# Patient Record
Sex: MALE | Race: OTHER | NOT HISPANIC OR LATINO
[De-identification: names, ages, dates, MRNs, and addresses within clinical notes are randomized per-mention and may not be internally consistent; named-entity substitution may affect disease eponyms.]

---

## 2017-03-27 PROBLEM — Z00.00 ENCOUNTER FOR PREVENTIVE HEALTH EXAMINATION: Status: ACTIVE | Noted: 2017-03-27

## 2022-04-13 ENCOUNTER — TRANSCRIPTION ENCOUNTER (OUTPATIENT)
Age: 88
End: 2022-04-13

## 2022-07-19 ENCOUNTER — INPATIENT (INPATIENT)
Facility: HOSPITAL | Age: 88
LOS: 1 days | Discharge: HOME | End: 2022-07-21
Attending: INTERNAL MEDICINE | Admitting: INTERNAL MEDICINE

## 2022-07-19 VITALS
RESPIRATION RATE: 18 BRPM | OXYGEN SATURATION: 100 % | SYSTOLIC BLOOD PRESSURE: 182 MMHG | TEMPERATURE: 98 F | DIASTOLIC BLOOD PRESSURE: 79 MMHG | HEART RATE: 65 BPM

## 2022-07-19 DIAGNOSIS — R56.9 UNSPECIFIED CONVULSIONS: ICD-10-CM

## 2022-07-19 LAB
ALBUMIN SERPL ELPH-MCNC: 4.2 G/DL — SIGNIFICANT CHANGE UP (ref 3.3–5)
ALP SERPL-CCNC: 67 U/L — SIGNIFICANT CHANGE UP (ref 40–120)
ALT FLD-CCNC: 14 U/L — SIGNIFICANT CHANGE UP (ref 4–41)
ANION GAP SERPL CALC-SCNC: 12 MMOL/L — SIGNIFICANT CHANGE UP (ref 7–14)
AST SERPL-CCNC: 17 U/L — SIGNIFICANT CHANGE UP (ref 4–40)
BASOPHILS # BLD AUTO: 0.05 K/UL — SIGNIFICANT CHANGE UP (ref 0–0.2)
BASOPHILS NFR BLD AUTO: 0.8 % — SIGNIFICANT CHANGE UP (ref 0–2)
BILIRUB SERPL-MCNC: 0.4 MG/DL — SIGNIFICANT CHANGE UP (ref 0.2–1.2)
BUN SERPL-MCNC: 19 MG/DL — SIGNIFICANT CHANGE UP (ref 7–23)
CALCIUM SERPL-MCNC: 9.6 MG/DL — SIGNIFICANT CHANGE UP (ref 8.4–10.5)
CHLORIDE SERPL-SCNC: 105 MMOL/L — SIGNIFICANT CHANGE UP (ref 98–107)
CO2 SERPL-SCNC: 24 MMOL/L — SIGNIFICANT CHANGE UP (ref 22–31)
CREAT SERPL-MCNC: 1.1 MG/DL — SIGNIFICANT CHANGE UP (ref 0.5–1.3)
EGFR: 63 ML/MIN/1.73M2 — SIGNIFICANT CHANGE UP
EOSINOPHIL # BLD AUTO: 0.21 K/UL — SIGNIFICANT CHANGE UP (ref 0–0.5)
EOSINOPHIL NFR BLD AUTO: 3.2 % — SIGNIFICANT CHANGE UP (ref 0–6)
GLUCOSE SERPL-MCNC: 107 MG/DL — HIGH (ref 70–99)
HCT VFR BLD CALC: 41.1 % — SIGNIFICANT CHANGE UP (ref 39–50)
HGB BLD-MCNC: 13.2 G/DL — SIGNIFICANT CHANGE UP (ref 13–17)
IANC: 4.44 K/UL — SIGNIFICANT CHANGE UP (ref 1.8–7.4)
IMM GRANULOCYTES NFR BLD AUTO: 0.5 % — SIGNIFICANT CHANGE UP (ref 0–1.5)
LYMPHOCYTES # BLD AUTO: 0.98 K/UL — LOW (ref 1–3.3)
LYMPHOCYTES # BLD AUTO: 15 % — SIGNIFICANT CHANGE UP (ref 13–44)
MCHC RBC-ENTMCNC: 29.5 PG — SIGNIFICANT CHANGE UP (ref 27–34)
MCHC RBC-ENTMCNC: 32.1 GM/DL — SIGNIFICANT CHANGE UP (ref 32–36)
MCV RBC AUTO: 91.7 FL — SIGNIFICANT CHANGE UP (ref 80–100)
MONOCYTES # BLD AUTO: 0.84 K/UL — SIGNIFICANT CHANGE UP (ref 0–0.9)
MONOCYTES NFR BLD AUTO: 12.8 % — SIGNIFICANT CHANGE UP (ref 2–14)
NEUTROPHILS # BLD AUTO: 4.44 K/UL — SIGNIFICANT CHANGE UP (ref 1.8–7.4)
NEUTROPHILS NFR BLD AUTO: 67.7 % — SIGNIFICANT CHANGE UP (ref 43–77)
NRBC # BLD: 0 /100 WBCS — SIGNIFICANT CHANGE UP
NRBC # FLD: 0 K/UL — SIGNIFICANT CHANGE UP
PLATELET # BLD AUTO: 222 K/UL — SIGNIFICANT CHANGE UP (ref 150–400)
POTASSIUM SERPL-MCNC: 5 MMOL/L — SIGNIFICANT CHANGE UP (ref 3.5–5.3)
POTASSIUM SERPL-SCNC: 5 MMOL/L — SIGNIFICANT CHANGE UP (ref 3.5–5.3)
PROT SERPL-MCNC: 6.9 G/DL — SIGNIFICANT CHANGE UP (ref 6–8.3)
RBC # BLD: 4.48 M/UL — SIGNIFICANT CHANGE UP (ref 4.2–5.8)
RBC # FLD: 13.6 % — SIGNIFICANT CHANGE UP (ref 10.3–14.5)
SODIUM SERPL-SCNC: 141 MMOL/L — SIGNIFICANT CHANGE UP (ref 135–145)
TROPONIN T, HIGH SENSITIVITY RESULT: 12 NG/L — SIGNIFICANT CHANGE UP
TROPONIN T, HIGH SENSITIVITY RESULT: 40 NG/L — SIGNIFICANT CHANGE UP
WBC # BLD: 6.55 K/UL — SIGNIFICANT CHANGE UP (ref 3.8–10.5)
WBC # FLD AUTO: 6.55 K/UL — SIGNIFICANT CHANGE UP (ref 3.8–10.5)

## 2022-07-19 PROCEDURE — 70498 CT ANGIOGRAPHY NECK: CPT | Mod: 26,MA

## 2022-07-19 PROCEDURE — 99284 EMERGENCY DEPT VISIT MOD MDM: CPT | Mod: FS

## 2022-07-19 PROCEDURE — 99223 1ST HOSP IP/OBS HIGH 75: CPT

## 2022-07-19 PROCEDURE — 70496 CT ANGIOGRAPHY HEAD: CPT | Mod: 26,MA

## 2022-07-19 PROCEDURE — 70450 CT HEAD/BRAIN W/O DYE: CPT | Mod: 26,MA

## 2022-07-19 RX ORDER — DEXAMETHASONE 0.5 MG/5ML
4 ELIXIR ORAL ONCE
Refills: 0 | Status: COMPLETED | OUTPATIENT
Start: 2022-07-19 | End: 2022-07-19

## 2022-07-19 RX ADMIN — Medication 4 MILLIGRAM(S): at 21:59

## 2022-07-19 RX ADMIN — Medication 2 MILLIGRAM(S): at 20:00

## 2022-07-19 NOTE — CONSULT NOTE ADULT - SUBJECTIVE AND OBJECTIVE BOX
NEUROSURGERY CONSULT 89M pmhx htn, hld, cad stents on xarelto, bypass, PPM, known, brain mass meningioma. all previous care at [facility] Neurologist is Dr. Hawthorne. He presented today from intermediate after episode of syncope. While in ED at [facility] had a grand mal seizure, ativan given and CTH done. CTH showing R. posterior frontal mass with vasogenic edema.     HPI: 89y Male    RADIOLOGY: IMPRESSION:    HEAD CT: Mild volume loss, microvascular disease, no acute hemorrhage or   midline shift.  Right posterior frontal mass 2.8 x 2.9 x 2.5 cm near the falx suggesting   meningioma, however there is extensive adjacent vasogenic edema in the   right frontal lobe. History of "benign brain tumor."      MEDS:  dexAMETHasone  Injectable 4 milliGRAM(s) IV Push Once  LORazepam   Injectable 2 milliGRAM(s) IV Push once      PHYSICAL EXAM: post ictal, ativan given, awake, OE, FC  A&Ox1  R. pupil surgical, L. pupil reactive  [place] antigravity    Vital Signs Last 24 Hrs  T(C): 36.7 (19 Jul 2022 16:13), Max: 36.7 (19 Jul 2022 16:13)  T(F): 98 (19 Jul 2022 16:13), Max: 98 (19 Jul 2022 16:13)  HR: 65 (19 Jul 2022 16:13) (65 - 65)  BP: 182/79 (19 Jul 2022 16:13) (182/79 - 182/79)  BP(mean): --  RR: 18 (19 Jul 2022 16:13) (18 - 18)  SpO2: 100% (19 Jul 2022 16:13) (100% - 100%)        LABS:                        13.2   6.55  )-----------( 222      ( 19 Jul 2022 19:45 )             41.1     07-19    141  |  105  |  19  ----------------------------<  107<H>  5.0   |  24  |  1.10    Ca    9.6      19 Jul 2022 19:45    TPro  6.9  /  Alb  4.2  /  TBili  0.4  /  DBili  x   /  AST  17  /  ALT  14  /  AlkPhos  67  07-19

## 2022-07-19 NOTE — CONSULT NOTE ADULT - ASSESSMENT
89M with multiple comorbidities s/p syncope, seizure with known R. sided brain mass likely meningioma with edema, no midline shift.     Recc:  - medicine admit for syncope w/u  - neurology f/u for seizures ( veeg)  - c/w AED  - decadron 4q6  - mri brain w/wo if PPM compatible  - Obtain prior imaging from OSH for comparison  - d/w family C Alert and oriented to person, place, time/situation. normal mood and affect. no apparent risk to self or others.

## 2022-07-19 NOTE — ED PROVIDER NOTE - ATTENDING APP SHARED VISIT CONTRIBUTION OF CARE
89e yo m past medical history htn, hld, cad s/p stents, cabg, PPM, benign brain mass (?meningioma per daughter), seizure disorder presents after loc, ?fainting during a lecture at his assisted living. no known head trauma, did report left knee and hip pain. At time of my eval patient experience generalized seizure. given 2mg ativan iv with session of seizure.   GEN: no acute respiratory distress. post-ictal  HEENT: NCAT. face symmetrical. chronic right irregular pupil-post sx. left pupil 3mm reactive. MMM, abrasion to left medial tongue.   Neck: no JVD, trachea midline, no LAD  CV: RRR. +S1S2, no murmur. 2+ pulses in 4 extremities, cap refill <2 sec  Chest: CTA B/l. no wheezing, rales, rhonchi. no retractions. good air movement.   ABD: +BS, soft, non distended, non tender.   : no cva or suprapubic tenderness  MSK: No clubbing, cyanosis, edema. passive FROM of all extremities. no gross deformity. no tenderness to palpation. No midline or paraspinal tenderness. no spinal step-offs.  Neuro: post-ictal, responding to noxious stimulus, name  SKIN: No erythema, lesions or rash, lacerations.   ct brain expedited no acute bleeding, likely meningioma with vasogenic edema. NSX and Neurology consulted. daughter at bedside and frequently updated.

## 2022-07-19 NOTE — ED ADULT NURSE NOTE - CHIEF COMPLAINT QUOTE
Pt brought in by EMS from assisted living after a syncope episode while sitting in a chair. Pt states he slid out of a chair. Pt has a hx of seizures and medication recently changed. Pt takes Xarelto. Pt denies chest pain, sob, n/v/d, fever or chills. As per Daughter pt has a hx of a benign brain tumor.

## 2022-07-19 NOTE — ED PROVIDER NOTE - PROGRESS NOTE DETAILS
cara manuel: pt had witnissed grand mal seizure few min ago, given ativan, placed on NRB, monitor, will expedite CT cara manuel: pt had witnissed grand mal seizure few min ago, given ativan, placed on NRB, monitor, will expedite CT  Octavio Denise DO: agree with above. on lamotrigine 100mg qd increased to this dose 2 weeks ago. per daughter is med adherent cara manuel: pt seen by neurology, neurosurgery, pt will be admitted for mri and further workup to medicine

## 2022-07-19 NOTE — ED PROVIDER NOTE - CLINICAL SUMMARY MEDICAL DECISION MAKING FREE TEXT BOX
89 y/o male pmh htn, hld CAD, stents, bypass, pacemaker, benign brain tumor, seizure d/o last one 3  months ago, resides in assisted living, was at a lecture few hours ago, and "fainted", as per the patient he fell asleep, others say he fainted, fell onto his left side, denies hitting his head, any HA, neck pain, visual disturbances, denies any aura or preceding symptoms, states that he did 1 hr of physical therapy this mornig and wa stired from it, denies any chest pain, sob, abdominal pain, urinary symptoms, numbness/weakness/tingling  - exam w nl,   ekg shows 89 y/o male pmh htn, hld CAD, stents, bypass, pacemaker, benign brain tumor, seizure d/o last one 3  months ago, resides in assisted living, was at a lecture few hours ago, and "fainted", as per the patient he fell asleep, others say he fainted, fell onto his left side, denies hitting his head, any HA, neck pain, visual disturbances, denies any aura or preceding symptoms, states that he did 1 hr of physical therapy this mornig and wa stired from it, denies any chest pain, sob, abdominal pain, urinary symptoms, numbness/weakness/tingling   ekg shows- ventricula paced  labs, head ct,  hip/femur/knee xr, reasses

## 2022-07-19 NOTE — ED ADULT NURSE NOTE - OBJECTIVE STATEMENT
Pt arriving to room 29 A&XO4 ambulatory s/p fall today. As per Pt family, Pt was asleep in a chair and fell on the floor. Pt does not remember falling, unsure if he hit his head. Pt on AC for "heart problems." Denies HA. No obvious deformity noted. No facial droop/slurred speech. Daughter at bedside. MD valencia pending.

## 2022-07-19 NOTE — ED ADULT NURSE REASSESSMENT NOTE - NS ED NURSE REASSESS COMMENT FT1
at approx 2015pm today, pt began having seizure witnessed by daughter. ED MD made aware immediately. seizue lasted approx 2min, pt bit tongue and pt suctioned. 2mg ativan given. pt placed on non rebreather and sent to ct scan. pt now lethargic but can be aroused.

## 2022-07-19 NOTE — ED PROVIDER NOTE - OBJECTIVE STATEMENT
89 y/o male pmh htn, hld CAD, stents, bypass, pacemaker, benign brain tumor, seizure d/o last one 3  months ago, resides in assisted living, was at a lecture few hours ago, and "fainted", as per the patient he fell asleep, others say he fainted, fell onto his left side, denies hitting his head, any HA, neck pain, visual disturbances, denies any aura or preceding symptoms, states that he did 1 hr of physical therapy this mornig and wa stired from it, denies any chest pain, sob, abdominal pain, urinary symptoms, numbness/weakness/tingling 89 y/o male pmh htn, hld CAD, stents, bypass, pacemaker, benign brain tumor, seizure d/o last one 3  months ago, resides in assisted living, was at a lecture few hours ago, and "fainted", as per the patient he fell asleep, others say he fainted, fell onto his left side, denies hitting his head, any HA, neck pain, visual disturbances, denies any aura or preceding symptoms, states that he did 1 hr of physical therapy this mornig and wa stired from it, denies any chest pain, sob, abdominal pain, urinary symptoms, numbness/weakness/tingling. pt is on xarelto, denies hitting his head 89 y/o male pmh htn, hld CAD, stents, bypass, pacemaker, benign brain tumor, seizure d/o last one 3  months ago, resides in assisted living, was at a lecture few hours ago, and "fainted", as per the patient he fell asleep, others say he fainted, fell onto his left side, denies hitting his head, any HA, neck pain, visual disturbances, denies any aura or preceding symptoms, states that he did 1 hr of physical therapy this mornig and was tired from it, denies any chest pain, sob, abdominal pain, urinary symptoms, numbness/weakness/tingling. pt is on xarelto, denies hitting his head. + c/o left  hip/knee pain

## 2022-07-19 NOTE — CONSULT NOTE ADULT - ASSESSMENT
89y (18-Dec-1928) man with a PMHx significant for HTN, HLD, CAD s/p stents, pacemaker, h/o benign brain tumor, seizures (last reported seizure 3 months ago), who presented to the ED for syncope. Neurology was consulted for seizure management. PT was at a lecture a few hours ago and fainted vs was sleeping vs seizure and fell onto his left side. While in the ED pt had a witnessed generalized tonic clonic seizure lasting two min and broke with 2mg ativan. Pt seen by Neurology and was confused, likely post ictal. otherwise denied any pain or distress. further history limited due to mental status change. Per ED, pt on lamotrigine 100mg qd increased to this dose 2 weeks ago. per daughter is med adherent.    Impression:    Generalized tonic clonic seizure likely 2/2 right posterior frontal mass near the falx with extensive adjacent vasogenic edema in the right frontal lobe    Recommendations     [] Hold PTA lemictal   [] Give 20 mg/kg of Keppra stat. Followed by 1 gram BID starting tomorrow AM.   [] Obtain baseline EKG to assess NM elongation   [] Obtain VEEG   [] follow up with Neurosurgery, recs appreciated   [] MRI brain with and without contrast   [] Start steroids as per Neurosurgery recs     Rest of work up per primary team     Discuss case with Epilepsy fellow Dr. Long.   Case to be seen in AM by Rebel   89y (18-Dec-1928) man with a PMHx significant for HTN, HLD, CAD s/p stents, pacemaker, h/o benign brain tumor, seizures (last reported seizure 3 months ago), who presented to the ED for syncope. Neurology was consulted for seizure management. PT was at a lecture a few hours ago and fainted vs was sleeping vs seizure and fell onto his left side. While in the ED pt had a witnessed generalized tonic clonic seizure lasting two min and broke with 2mg ativan. Pt seen by Neurology and was confused, likely post ictal. otherwise denied any pain or distress. further history limited due to mental status change. Per ED, pt on lamotrigine 100mg qd increased to this dose 2 weeks ago. per daughter is med adherent.    Impression:    Generalized tonic clonic seizure likely 2/2 right posterior frontal mass near the falx with extensive adjacent vasogenic edema in the right frontal lobe    Recommendations     [] Hold PTA lemictal   [] Give 20 mg/kg of Keppra stat. Followed by 1 gram BID starting tomorrow AM.   [] Obtain baseline EKG to assess CO elongation   [] Obtain VEEG   [] follow up with Neurosurgery, recs appreciated   [] MRI brain with and without contrast   [] Start steroids as per Neurosurgery recs     Rest of work up per primary team     Discuss case with Epilepsy fellow Dr. Long.   Case to be seen in AM by Rebel     NEUROLOGY ATTENDING ADDENDUM    I personally interviewed, examined, and participated in the care of this patient, on rounds 7/20/22 with the neurology consult service team.  Reviewed findings and management plan with the team.  In addition to or instead of the Hx and findings and plan reported above, or in particular, I note as follows:    The lecture he attended was in his assisted living facility.      On examination now (~noon) awake, alert.  Normal comprehension, expression, prosody, articulation.  Gives correct date.  Aware of current events (expounds on recent supreme court dec=ision, war in [place], . . . ).  Nems fingers correctly, quickly.  Serial 7s: cannnot get past 93.  On attempting to mimic examiner's posture of double crossover of the UEs he mirror images  rather than copying correctly; even when examiner shifts to stand beside him looking in the same direction he does not recognize the problem and does not correct.  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      No UE drift.   Kaleb of UE intact.  No UE drift.  Grossly normal symmetric limb motor strength on confrontation testing.  Gait and station not assesssed    Post-ictal confusion apparently has resolved.  I agree with the above assessment and recommendations.  In addition, he appears to have some subtle cognitive deficits (attention on seria 7s, spatial orientation).  I agree with the above recommendations.  In addition, if not already ordered, please request B12, folate, methylmalonic acid, homocysteine, TSH, syphilis erology.      The assay for B12 does not measure B12 level directly.  False normal and false elevations (to or above the normal range) occur with pernicious anemia due to intrinsic factor antibodies, which may or may not be detected by antibody tests.  B12-like compounds of vegetable origin (a problem with vegans) without cobalamin activity also can lead to falsely normal or elevated determinations.  Methylmalonic acid (MMA) and homocysteine (Hcy) determinations are necessary to elucidate what is happening.    Irrespective of B12 and folate levels:       high Hcy w normal MMA implies folate deficiency;        high MMA and Hcy implies B12 deficiency +/- folate deficiency.                                                         IMPORTANT  -  PLEASE NOTE:                              I am a neurohospitalist. I do not see patients outside of the hospital.        Patients requiring neurological follow-up after discharge may contact one of the following offices.     [facility] [address] [place] [address] [phone]    [facility]  [address].  [place], [place]  [phone]      Amanda Luque M.D.   - Department of Neurology  [employer] at [facility]    89y (18-Dec-1928) man with a PMHx significant for HTN, HLD, CAD s/p stents, pacemaker, h/o benign brain tumor, seizures (last reported seizure 3 months ago), who presented to the ED for syncope. Neurology was consulted for seizure management. PT was at a lecture a few hours ago and fainted vs was sleeping vs seizure and fell onto his left side. While in the ED pt had a witnessed generalized tonic clonic seizure lasting two min and broke with 2mg ativan. Pt seen by Neurology and was confused, likely post ictal. otherwise denied any pain or distress. further history limited due to mental status change. Per ED, pt on lamotrigine 100mg qd increased to this dose 2 weeks ago. per daughter is med adherent.    Impression:    Generalized tonic clonic seizure likely 2/2 right posterior frontal mass near the falx with extensive adjacent vasogenic edema in the right frontal lobe    Recommendations     [] Hold PTA lemictal   [] Give 20 mg/kg of Keppra stat. Followed by 1 gram BID starting tomorrow AM.   [] Obtain baseline EKG to assess TN elongation   [] Obtain VEEG   [] follow up with Neurosurgery, recs appreciated   [] MRI brain with and without contrast   [] Start steroids as per Neurosurgery recs NEUROLOGY ATTENDING: we disagree; no indocation for steroid Rx at this time.  Would D/C.      Rest of work up per primary team     Discuss case with Epilepsy fellow Dr. Long.   Case to be seen in AM by Rebel     NEUROLOGY ATTENDING ADDENDUM    I personally interviewed, examined, and participated in the care of this patient, on rounds 7/20/22 with the neurology consult service team.  Reviewed findings and management plan with the team.  In addition to or instead of the Hx and findings and plan reported above, or in particular, I note as follows:    The lecture he attended was in his assisted living facility.      On examination now (~noon) awake, alert.  Normal comprehension, expression, prosody, articulation.  Gives correct date.  Aware of current events (expounds on recent supreme court dec=ision, war in [place], . . . ).  Nems fingers correctly, quickly.  Serial 7s: cannnot get past 93.  On attempting to mimic examiner's posture of double crossover of the UEs he mirror images  rather than copying correctly; even when examiner shifts to stand beside him looking in the same direction he does not recognize the problem and does not correct.  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      No UE drift.   Kaleb of UE intact.  No UE drift.  Grossly normal symmetric limb motor strength on confrontation testing.  Gait and station not assesssed    Post-ictal confusion apparently has resolved.  I agree with the above assessment and recommendations.  In addition, he appears to have some subtle cognitive deficits (attention on seria 7s, spatial orientation).  I agree with the above recommendations.  In addition, if not already ordered, please request B12, folate, methylmalonic acid, homocysteine, TSH, syphilis erology.      The assay for B12 does not measure B12 level directly.  False normal and false elevations (to or above the normal range) occur with pernicious anemia due to intrinsic factor antibodies, which may or may not be detected by antibody tests.  B12-like compounds of vegetable origin (a problem with vegans) without cobalamin activity also can lead to falsely normal or elevated determinations.  Methylmalonic acid (MMA) and homocysteine (Hcy) determinations are necessary to elucidate what is happening.    Irrespective of B12 and folate levels:       high Hcy w normal MMA implies folate deficiency;        high MMA and Hcy implies B12 deficiency +/- folate deficiency.                                                         IMPORTANT  -  PLEASE NOTE:                              I am a neurohospitalist. I do not see patients outside of the hospital.        Patients requiring neurological follow-up after discharge may contact one of the following offices.     [address] [id number] [phone]    [facility]  [address].  [place], [place]  [phone]      Amanda Luque M.D.   - Department of Neurology  [employer] at [facility]/[place]

## 2022-07-19 NOTE — CONSULT NOTE ADULT - SUBJECTIVE AND OBJECTIVE BOX
Neurology - Consult Note    -  Spectra: [phone] ([facility]), [phone] ([facility])  -    HPI: Patient TOBIN MALDONADO is a 89y (18-Dec-1928) man with a PMHx significant for HTN, HLD, CAD s/p stents, pacemaker, h/o benign brain tumor, seizures (last reported seizure 3 months ago), who presented to the ED for syncope. Neurology was consulted for seizure management. PT was at a lecture a few hours ago and fainted vs was sleeping vs seizure and fell onto his left side. While in the ED pt had a witnessed generalized tonic clonic seizure lasting two min and broke with 2mg ativan. Pt seen by Neurology and was confused, likely post ictal. otherwise denied any pain or distress. further history limited due to mental status change. Per ED, pt on lamotrigine 100mg qd increased to this dose 2 weeks ago. per daughter is med adherent.      Review of Systems:    unable to assess due to mental status change       PMHx/PSHx/Family Hx: As above, otherwise see below         Medications:  MEDICATIONS  (STANDING):  dexAMETHasone  Injectable 4 milliGRAM(s) IV Push Once  LORazepam   Injectable 2 milliGRAM(s) IV Push once    MEDICATIONS  (PRN):      Vitals:  T(C): 36.7 (07-19-22 @ 16:13), Max: 36.7 (07-19-22 @ 16:13)  HR: 65 (07-19-22 @ 16:13) (65 - 65)  BP: 182/79 (07-19-22 @ 16:13) (182/79 - 182/79)  RR: 18 (07-19-22 @ 16:13) (18 - 18)  SpO2: 100% (07-19-22 @ 16:13) (100% - 100%)    Physical Examination:   General - laying down with eyes closed. in no acute distress   Cardiovascular - Peripheral pulses palpable, no edema    Neurologic Exam:  Mental status - Awake, Alert, not oriented to person, place, or time. thought his age was 38 and could not provide the year.   Cranial nerves - Surgical pupil in R eye with no apparent APD. unable to assess further due to non-compliance and mental status changes. Face symmetrical.   Motor - Normal bulk and tone throughout. moves all extremities spontaneously.  Sensation - unable to assess due to mental status change   DTR's - Toes/plantar response withdrawals b/l   Coordination - unable to assess due to mental status change   Gait and station unable to assess due to mental status change     Labs:                        13.2   6.55  )-----------( 222      ( 19 Jul 2022 19:45 )             41.1     07-19    141  |  105  |  19  ----------------------------<  107<H>  5.0   |  24  |  1.10    Ca    9.6      19 Jul 2022 19:45    TPro  6.9  /  Alb  4.2  /  TBili  0.4  /  DBili  x   /  AST  17  /  ALT  14  /  AlkPhos  67  07-19    CAPILLARY BLOOD GLUCOSE        LIVER FUNCTIONS - ( 19 Jul 2022 19:45 )  Alb: 4.2 g/dL / Pro: 6.9 g/dL / ALK PHOS: 67 U/L / ALT: 14 U/L / AST: 17 U/L / GGT: x               Radiology:  CT Head No Cont:  (19 Jul 2022 20:27)  < from: CT Head No Cont (07.19.22 @ 20:27) >  IMPRESSION:    HEAD CT: Mild volume loss, microvascular disease, no acute hemorrhage or   midline shift.  Right posterior frontal mass 2.8 x 2.9 x 2.5 cm near the falx suggesting   meningioma, however there is extensive adjacent vasogenic edema in the   right frontal lobe. History of "benign brain tumor."    --- End of Report ---    < end of copied text >       Neurology - Consult Note    -  Spectra: [phone] ([facility]), [phone] ([facility])  -    HPI: Patient TOBIN MALDONADO is a 89y (18-Dec-1928) man with a PMHx significant for HTN, HLD, CAD s/p stents, pacemaker, h/o benign brain tumor, seizures (last reported seizure 3 months ago), who presented to the ED for syncope. Neurology was consulted for seizure management. PT was at a lecture a few hours ago and fainted vs was sleeping vs seizure and fell onto his left side. While in the ED pt had a witnessed generalized tonic clonic seizure lasting two min and broke with 2mg ativan. Pt seen by Neurology and was confused, likely post ictal. otherwise denied any pain or distress. further history limited due to mental status change. Per ED, pt on lamotrigine 100mg qd increased to this dose 2 weeks ago. per daughter is med adherent.      Review of Systems:    unable to assess due to mental status change       PMHx/PSHx/Family Hx: As above, otherwise see below         Medications:  MEDICATIONS  (STANDING):  dexAMETHasone  Injectable 4 milliGRAM(s) IV Push Once  LORazepam   Injectable 2 milliGRAM(s) IV Push once    MEDICATIONS  (PRN):      Vitals:  T(C): 36.7 (07-19-22 @ 16:13), Max: 36.7 (07-19-22 @ 16:13)  HR: 65 (07-19-22 @ 16:13) (65 - 65)  BP: 182/79 (07-19-22 @ 16:13) (182/79 - 182/79)  RR: 18 (07-19-22 @ 16:13) (18 - 18)  SpO2: 100% (07-19-22 @ 16:13) (100% - 100%)    Physical Examination:   General - laying down with eyes closed. in no acute distress   Cardiovascular - Peripheral pulses palpable, no edema    Neurologic Exam:  Mental status - Awake, Alert, not oriented to person, [Neurology Attending comment: there is NO SUCH THING as not "oriented" to self. This is a historically misconstrued concept perpetuated by longstanding bad habit.  Inability to answer the question "what is your name?" does not mean the person has lost personal identity!] place, or time. thought his age was 38 and could not provide the year.   Cranial nerves - Surgical pupil in R eye with no apparent APD. unable to assess further due to non-compliance and mental status changes. Face symmetrical.   Motor - Normal bulk and tone throughout. moves all extremities spontaneously.  Sensation - unable to assess due to mental status change   DTR's - Toes/plantar response withdrawals b/l   Coordination - unable to assess due to mental status change   Gait and station unable to assess due to mental status change     Labs:                        13.2   6.55  )-----------( 222      ( 19 Jul 2022 19:45 )             41.1     07-19    141  |  105  |  19  ----------------------------<  107<H>  5.0   |  24  |  1.10    Ca    9.6      19 Jul 2022 19:45    TPro  6.9  /  Alb  4.2  /  TBili  0.4  /  DBili  x   /  AST  17  /  ALT  14  /  AlkPhos  67  07-19    CAPILLARY BLOOD GLUCOSE        LIVER FUNCTIONS - ( 19 Jul 2022 19:45 )  Alb: 4.2 g/dL / Pro: 6.9 g/dL / ALK PHOS: 67 U/L / ALT: 14 U/L / AST: 17 U/L / GGT: x               Radiology:  CT Head No Cont:  (19 Jul 2022 20:27)  < from: CT Head No Cont (07.19.22 @ 20:27) >  IMPRESSION:    HEAD CT: Mild volume loss, microvascular disease, no acute hemorrhage or   midline shift.  Right posterior frontal mass 2.8 x 2.9 x 2.5 cm near the falx suggesting   meningioma, however there is extensive adjacent vasogenic edema in the   right frontal lobe. History of "benign brain tumor."    --- End of Report ---    < end of copied text >

## 2022-07-19 NOTE — ED PROVIDER NOTE - NS ED ATTENDING STATEMENT MOD
This was a shared visit with the FLAQUITO. I reviewed and verified the documentation and independently performed the documented:

## 2022-07-19 NOTE — ED ADULT TRIAGE NOTE - CHIEF COMPLAINT QUOTE
Pt brought in by EMS from assisted living after a syncope episode while sitting in a chair. Pt states he slid out of a chair. Pt states he has had multiple syncopal episodes over the past few weeks. Pt takes Xarelto. Pt denies chest pain, sob, n/v/d, fever or chills. Pt brought in by EMS from assisted living after a syncope episode while sitting in a chair. Pt states he slid out of a chair. Pt has a hx of seizures and medication recently changed. Pt takes Xarelto. Pt denies chest pain, sob, n/v/d, fever or chills. Pt brought in by EMS from assisted living after a syncope episode while sitting in a chair. Pt states he slid out of a chair. Pt has a hx of seizures and medication recently changed. Pt takes Xarelto. Pt denies chest pain, sob, n/v/d, fever or chills. As per Daughter pt has a hx of a benign brain tumor.

## 2022-07-20 DIAGNOSIS — R06.02 SHORTNESS OF BREATH: ICD-10-CM

## 2022-07-20 DIAGNOSIS — R55 SYNCOPE AND COLLAPSE: ICD-10-CM

## 2022-07-20 DIAGNOSIS — G93.89 OTHER SPECIFIED DISORDERS OF BRAIN: ICD-10-CM

## 2022-07-20 DIAGNOSIS — Z95.1 PRESENCE OF AORTOCORONARY BYPASS GRAFT: Chronic | ICD-10-CM

## 2022-07-20 DIAGNOSIS — Z29.9 ENCOUNTER FOR PROPHYLACTIC MEASURES, UNSPECIFIED: ICD-10-CM

## 2022-07-20 DIAGNOSIS — E78.5 HYPERLIPIDEMIA, UNSPECIFIED: ICD-10-CM

## 2022-07-20 DIAGNOSIS — R56.9 UNSPECIFIED CONVULSIONS: ICD-10-CM

## 2022-07-20 DIAGNOSIS — G40.409 OTHER GENERALIZED EPILEPSY AND EPILEPTIC SYNDROMES, NOT INTRACTABLE, WITHOUT STATUS EPILEPTICUS: ICD-10-CM
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
## 2022-07-20 DIAGNOSIS — R91.1 SOLITARY PULMONARY NODULE: ICD-10-CM

## 2022-07-20 DIAGNOSIS — I10 ESSENTIAL (PRIMARY) HYPERTENSION: ICD-10-CM

## 2022-07-20 DIAGNOSIS — I48.20 CHRONIC ATRIAL FIBRILLATION, UNSPECIFIED: ICD-10-CM

## 2022-07-20 DIAGNOSIS — I25.10 ATHEROSCLEROTIC HEART DISEASE OF NATIVE CORONARY ARTERY WITHOUT ANGINA PECTORIS: ICD-10-CM

## 2022-07-20 DIAGNOSIS — Z95.0 PRESENCE OF CARDIAC PACEMAKER: Chronic | ICD-10-CM

## 2022-07-20 DIAGNOSIS — Z96.641 PRESENCE OF RIGHT ARTIFICIAL HIP JOINT: Chronic | ICD-10-CM

## 2022-07-20 DIAGNOSIS — Z86.59 PERSONAL HISTORY OF OTHER MENTAL AND BEHAVIORAL DISORDERS: ICD-10-CM

## 2022-07-20 LAB
A1C WITH ESTIMATED AVERAGE GLUCOSE RESULT: 5.5 % — SIGNIFICANT CHANGE UP (ref 4–5.6)
ANION GAP SERPL CALC-SCNC: 16 MMOL/L — HIGH (ref 7–14)
BUN SERPL-MCNC: 17 MG/DL — SIGNIFICANT CHANGE UP (ref 7–23)
CALCIUM SERPL-MCNC: 9.2 MG/DL — SIGNIFICANT CHANGE UP (ref 8.4–10.5)
CHLORIDE SERPL-SCNC: 100 MMOL/L — SIGNIFICANT CHANGE UP (ref 98–107)
CHOLEST SERPL-MCNC: 187 MG/DL — SIGNIFICANT CHANGE UP
CK MB BLD-MCNC: 4.9 % — HIGH (ref 0–2.5)
CK MB CFR SERPL CALC: 3.5 NG/ML — SIGNIFICANT CHANGE UP
CK SERPL-CCNC: 72 U/L — SIGNIFICANT CHANGE UP (ref 30–200)
CO2 SERPL-SCNC: 20 MMOL/L — LOW (ref 22–31)
CREAT SERPL-MCNC: 0.88 MG/DL — SIGNIFICANT CHANGE UP (ref 0.5–1.3)
EGFR: 81 ML/MIN/1.73M2 — SIGNIFICANT CHANGE UP
ESTIMATED AVERAGE GLUCOSE: 111 — SIGNIFICANT CHANGE UP
FLUAV AG NPH QL: SIGNIFICANT CHANGE UP
FLUBV AG NPH QL: SIGNIFICANT CHANGE UP
GLUCOSE SERPL-MCNC: 161 MG/DL — HIGH (ref 70–99)
HCT VFR BLD CALC: 43.6 % — SIGNIFICANT CHANGE UP (ref 39–50)
HDLC SERPL-MCNC: 72 MG/DL — SIGNIFICANT CHANGE UP
HGB BLD-MCNC: 14.6 G/DL — SIGNIFICANT CHANGE UP (ref 13–17)
LIPID PNL WITH DIRECT LDL SERPL: 106 MG/DL — HIGH
MAGNESIUM SERPL-MCNC: 2 MG/DL — SIGNIFICANT CHANGE UP (ref 1.6–2.6)
MCHC RBC-ENTMCNC: 30 PG — SIGNIFICANT CHANGE UP (ref 27–34)
MCHC RBC-ENTMCNC: 33.5 GM/DL — SIGNIFICANT CHANGE UP (ref 32–36)
MCV RBC AUTO: 89.7 FL — SIGNIFICANT CHANGE UP (ref 80–100)
NON HDL CHOLESTEROL: 115 MG/DL — SIGNIFICANT CHANGE UP
NRBC # BLD: 0 /100 WBCS — SIGNIFICANT CHANGE UP
NRBC # FLD: 0 K/UL — SIGNIFICANT CHANGE UP
PHOSPHATE SERPL-MCNC: 3 MG/DL — SIGNIFICANT CHANGE UP (ref 2.5–4.5)
PLATELET # BLD AUTO: 221 K/UL — SIGNIFICANT CHANGE UP (ref 150–400)
POTASSIUM SERPL-MCNC: 4.1 MMOL/L — SIGNIFICANT CHANGE UP (ref 3.5–5.3)
POTASSIUM SERPL-SCNC: 4.1 MMOL/L — SIGNIFICANT CHANGE UP (ref 3.5–5.3)
RBC # BLD: 4.86 M/UL — SIGNIFICANT CHANGE UP (ref 4.2–5.8)
RBC # FLD: 13.4 % — SIGNIFICANT CHANGE UP (ref 10.3–14.5)
RSV RNA NPH QL NAA+NON-PROBE: SIGNIFICANT CHANGE UP
SARS-COV-2 RNA SPEC QL NAA+PROBE: SIGNIFICANT CHANGE UP
SODIUM SERPL-SCNC: 136 MMOL/L — SIGNIFICANT CHANGE UP (ref 135–145)
TRIGL SERPL-MCNC: 47 MG/DL — SIGNIFICANT CHANGE UP
TROPONIN T, HIGH SENSITIVITY RESULT: 47 NG/L — SIGNIFICANT CHANGE UP
WBC # BLD: 10.53 K/UL — HIGH (ref 3.8–10.5)
WBC # FLD AUTO: 10.53 K/UL — HIGH (ref 3.8–10.5)

## 2022-07-20 PROCEDURE — 93280 PM DEVICE PROGR EVAL DUAL: CPT | Mod: 26

## 2022-07-20 PROCEDURE — 73552 X-RAY EXAM OF FEMUR 2/>: CPT | Mod: 26,LT

## 2022-07-20 PROCEDURE — 95720 EEG PHY/QHP EA INCR W/VEEG: CPT

## 2022-07-20 PROCEDURE — 73560 X-RAY EXAM OF KNEE 1 OR 2: CPT | Mod: 26,LT

## 2022-07-20 PROCEDURE — 99222 1ST HOSP IP/OBS MODERATE 55: CPT

## 2022-07-20 PROCEDURE — 73502 X-RAY EXAM HIP UNI 2-3 VIEWS: CPT | Mod: 26,LT

## 2022-07-20 RX ORDER — LEVETIRACETAM 250 MG/1
1000 TABLET, FILM COATED ORAL
Refills: 0 | Status: DISCONTINUED | OUTPATIENT
Start: 2022-07-20 | End: 2022-07-21

## 2022-07-20 RX ORDER — ATORVASTATIN CALCIUM 80 MG/1
20 TABLET, FILM COATED ORAL AT BEDTIME
Refills: 0 | Status: DISCONTINUED | OUTPATIENT
Start: 2022-07-20 | End: 2022-07-21

## 2022-07-20 RX ORDER — SOTALOL HCL 120 MG
40 TABLET ORAL EVERY 12 HOURS
Refills: 0 | Status: DISCONTINUED | OUTPATIENT
Start: 2022-07-20 | End: 2022-07-21

## 2022-07-20 RX ORDER — ENOXAPARIN SODIUM 100 MG/ML
40 INJECTION SUBCUTANEOUS EVERY 24 HOURS
Refills: 0 | Status: DISCONTINUED | OUTPATIENT
Start: 2022-07-20 | End: 2022-07-21

## 2022-07-20 RX ORDER — ACETAMINOPHEN 500 MG
650 TABLET ORAL EVERY 6 HOURS
Refills: 0 | Status: DISCONTINUED | OUTPATIENT
Start: 2022-07-20 | End: 2022-07-21

## 2022-07-20 RX ORDER — ESCITALOPRAM OXALATE 10 MG/1
5 TABLET, FILM COATED ORAL AT BEDTIME
Refills: 0 | Status: DISCONTINUED | OUTPATIENT
Start: 2022-07-20 | End: 2022-07-21

## 2022-07-20 RX ORDER — DEXAMETHASONE 0.5 MG/5ML
4 ELIXIR ORAL EVERY 4 HOURS
Refills: 0 | Status: DISCONTINUED | OUTPATIENT
Start: 2022-07-21 | End: 2022-07-21

## 2022-07-20 RX ORDER — SOTALOL HCL 120 MG
40 TABLET ORAL EVERY 12 HOURS
Refills: 0 | Status: DISCONTINUED | OUTPATIENT
Start: 2022-07-20 | End: 2022-07-20

## 2022-07-20 RX ORDER — LEVETIRACETAM 250 MG/1
1500 TABLET, FILM COATED ORAL ONCE
Refills: 0 | Status: COMPLETED | OUTPATIENT
Start: 2022-07-20 | End: 2022-07-20

## 2022-07-20 RX ADMIN — LEVETIRACETAM 1000 MILLIGRAM(S): 250 TABLET, FILM COATED ORAL at 19:33

## 2022-07-20 RX ADMIN — ESCITALOPRAM OXALATE 5 MILLIGRAM(S): 10 TABLET, FILM COATED ORAL at 22:30

## 2022-07-20 RX ADMIN — LEVETIRACETAM 400 MILLIGRAM(S): 250 TABLET, FILM COATED ORAL at 06:36

## 2022-07-20 RX ADMIN — ATORVASTATIN CALCIUM 20 MILLIGRAM(S): 80 TABLET, FILM COATED ORAL at 22:30

## 2022-07-20 RX ADMIN — Medication 40 MILLIGRAM(S): at 20:07

## 2022-07-20 NOTE — H&P ADULT - NSHPSOCIALHISTORY_GEN_ALL_CORE
Pt is from [facility] assisted living Pt is from [facility] assisted living. Walks with a walker at baseline Pt is from [facility] assisted living. Denies any tobacco, alcohol or drug use. Walks with a walker at baseline

## 2022-07-20 NOTE — ED ADULT NURSE REASSESSMENT NOTE - NS ED NURSE REASSESS COMMENT FT1
Report and pt received from AZALIA Gonzalez, pt A&Ox1, resting on stretcher. Respirations even and unlabored, sating 100% on 3L o2 via NC, NSR on monitor. Seizure precautions in place, suction set up at bedside. Pt well appearing, NAD noted. Pending bed assignment. bed in lowest position, side rails up, call bell in hand, safety maintained.

## 2022-07-20 NOTE — OCCUPATIONAL THERAPY INITIAL EVALUATION ADULT - ANTICIPATED DISCHARGE DISPOSITION, OT EVAL
Dishcharge reccomendation to be determined pending functional ability assessment. Pt on hold at this time due to EEG.

## 2022-07-20 NOTE — CONSULT NOTE ADULT - PROBLEM SELECTOR RECOMMENDATION 9
RUL nodular opacity (1.2 cm) seen on CTA neck  -Former smoker  -No complaints of dyspnea. Minimal O2 requirements, pt denies any SOB. O2 likely utilized during seizure  -Wean O2 as tolerated. Keep sats >90%  -Check CT chest.

## 2022-07-20 NOTE — H&P ADULT - PROBLEM SELECTOR PLAN 9
holding Xarelto for now -/102  - Continue Home BP meds w/ parameters   - monitor BP   - DASH/TLC diet -if passes dysphagia screen

## 2022-07-20 NOTE — ED ADULT NURSE REASSESSMENT NOTE - NS ED NURSE REASSESS COMMENT FT1
Received report from day shift RN. Patient received A&Ox3 with 20G IV. On continuous EEG monitoring at this time. Pt offering no complaints and is in no acute distress at this time.  Respirations even and unlabored.  Stretcher in lowest position, wheels locked, side rails up as per protocol. Vital signs are per flowsheet. Transport, EDT,  and RN at bedside for [facility] transport. As per Neurology MD Santillan, okay to transport without Neurology team at this time.

## 2022-07-20 NOTE — OCCUPATIONAL THERAPY INITIAL EVALUATION ADULT - ADDITIONAL COMMENTS
Pt has an aide at [facility] for bathing and dressing. Pt has DME in bathroom.  Pt on EEG at this time, pending functional ability assessment.   Pt shows 4th finger on left hand bruising with limited ROM (not noted in H&P).

## 2022-07-20 NOTE — H&P ADULT - HISTORY OF PRESENT ILLNESS
89y/o M with a Hx significant for HTN, HLD, Afib on Xarelto, CAD s/p stents, pacemaker, h/o benign brain tumor, seizures (last reported seizure 3 months ago), who presented to the ED for syncope. Information obtained on chart review as pt did not have any complaints and to "leave me alone" and "go away". Per chart review pt was at a lecture few hours ago, and "fainted", as per the patient he fell asleep, others say he fainted. Pt fell onto his left side; denies hitting his head, any HA, neck pain, visual disturbances, denies any aura or preceding symptoms, states that he did 1 hr of physical therapy this morning and was tired from it, denies any chest pain, sob, abdominal pain, urinary symptoms, numbness/weakness/tingling. Endorses left hip/knee pain.    While in the ED pt had a witnessed generalized tonic clonic seizure lasting two min and broke with 2mg ativan.     In ED:   89y/o M with a Hx significant for HTN, HLD, Afib on Xarelto, CAD s/p stents, pacemaker, h/o benign brain tumor, seizures (last reported seizure 3 months ago), who presented to the ED for syncope. Information obtained on chart review as pt did not have any complaints and to "leave me alone" and "go away". Per chart review pt was at a lecture few hours ago, and "fainted", as per the patient he fell asleep, others say he fainted. Pt fell onto his left side; denies hitting his head, any HA, neck pain, visual disturbances, denies any aura or preceding symptoms, states that he did 1 hr of physical therapy this morning and was tired from it, denies any chest pain, sob, abdominal pain, urinary symptoms, numbness/weakness/tingling. Endorses left hip/knee pain.    While in the ED pt had a witnessed generalized tonic clonic seizure lasting two min and broke with 2mg ativan.     In ED: /79; HR 65, RR 18, SpO2 100% RA  s/p Ativan 2mg IVPx1 and decadron 4mg IVP x1   89y/o M with a Hx significant for HTN, HLD, Afib on Xarelto, CAD s/p stents and quadruple bypass, pacemaker, h/o benign brain tumor, seizures (last reported seizure 3 months ago), who presented to the ED for syncope. Information obtained on chart review as pt did not have any complaints and to "leave me alone" and "go away". Per chart review pt was at a lecture few hours ago, and "fainted", as per the patient he fell asleep, others say he fainted. Pt fell onto his left side; denies hitting his head, any HA, neck pain, visual disturbances, denies any aura or preceding symptoms, states that he did 1 hr of physical therapy this morning and was tired from it, denies any chest pain, sob, abdominal pain, urinary symptoms, numbness/weakness/tingling. Endorses left hip/knee pain.    While in the ED pt had a witnessed generalized tonic clonic seizure lasting two min and broke with 2mg ativan.     In ED: /79; HR 65, RR 18, SpO2 100% RA  s/p Ativan 2mg IVPx1 and decadron 4mg IVP x1

## 2022-07-20 NOTE — CONSULT NOTE ADULT - SUBJECTIVE AND OBJECTIVE BOX
Pulmonary Consult  07-20-22 @ 13:45    Patient is a 89y old  Male who presents with a chief complaint of Syncope (20 Jul 2022 10:39)    HPI: 89 y/o M with PMH of HTN, HLD, Afib on Xarelto, CAD s/p stents and CABG, pacemaker, h/o benign brain tumor, seizures (last reported seizure 3 months ago), who presented to the ED for syncope. While in ED, had witnessed generalized tonic clonic seizure, resolved with Ativan. On triage, O2 sats 100% on RA. CTH with R posterior frontal mass with vasogenic edema. Pulmonary called to consult for RUL nodular opacity seen on CTA Neck. Former smoker (quit 50 years ago, smoked for at least 20 years 1/2 pack a day. No hx of lung disease, inhaler use. Denies any SOB, CP, pleuritic chest pain, cough, fever, chills. O2 sats 99% on 3LNC.     ?FOLLOWING PRESENT  [ no ] Hx of PE/DVT, [ no ] Hx COPD, [ no ] Hx of Asthma, [ no] Hx of Hospitalization, [ no ]  Hx of BiPAP/CPAP use, [ no ] Hx of BRITTANY    No Known Allergies    PAST MEDICAL & SURGICAL HISTORY:  HLD (hyperlipidemia)  HTN (hypertension)  CAD (coronary artery disease)  Depression  Brain mass  Presence of permanent cardiac pacemaker  S/P quadruple vessel bypass  S/P hip replacement, right    FAMILY HISTORY:  No pertinent family history in first degree relatives    Social History: [ former ] TOBACCO                  [  no ] ETOH                                 [ no ] IVDA/DRUGS    REVIEW OF SYSTEMS    General: x	    Skin/Breast: x  	  Ophthalmologic: x  	  ENMT:	x     Respiratory and Thorax: x  	  Cardiovascular:	per above    Gastrointestinal:	 x    Genitourinary:	 x    Musculoskeletal:	 x    Neurological:	per above    Psychiatric:	 x    Hematology/Lymphatics:	 x    Endocrine:	x    Allergic/Immunologic:	 x    MEDICATIONS  (STANDING):  atorvastatin 20 milliGRAM(s) Oral at bedtime  enoxaparin Injectable 40 milliGRAM(s) SubCutaneous every 24 hours  escitalopram 5 milliGRAM(s) Oral at bedtime  levETIRAcetam 1000 milliGRAM(s) Oral two times a day  sotalol 40 milliGRAM(s) Oral every 12 hours    MEDICATIONS  (PRN):  acetaminophen     Tablet .. 650 milliGRAM(s) Oral every 6 hours PRN Temp greater or equal to 38C (100.4F), Mild Pain (1 - 3)    Vital Signs Last 24 Hrs  T(C): 36.4 (20 Jul 2022 12:31), Max: 36.7 (19 Jul 2022 16:13)  T(F): 97.5 (20 Jul 2022 12:31), Max: 98.1 (20 Jul 2022 03:38)  HR: 74 (20 Jul 2022 12:31) (65 - 106)  BP: 143/97 (20 Jul 2022 12:31) (125/66 - 182/79)  BP(mean): --  RR: 18 (20 Jul 2022 12:31) (12 - 18)  SpO2: 100% (20 Jul 2022 12:31) (96% - 100%)    Parameters below as of 20 Jul 2022 12:31  Patient On (Oxygen Delivery Method): nasal cannula  O2 Flow (L/min): 3  Orthostatic VS    Physical Exam:   GENERAL: NAD  HEENT: ANDREY; EEG in place  ENMT: No tonsillar erythema, exudates, or enlargement  NECK: Supple, No JVD  CHEST/LUNG: Clear to auscultation bilaterally  CVS: Regular rate and rhythm  GI: : Soft, Nontender, Nondistended  NERVOUS SYSTEM:  Alert & Oriented X2  EXTREMITIES:  2+ Peripheral Pulses, No clubbing, cyanosis, or edema  SKIN: No rashes or lesions  PSYCH: Appropriate    Labs:    CARDIAC MARKERS ( 19 Jul 2022 21:57 )  x     / x     / 72 U/L / x     / 3.5 ng/mL                            14.6   10.53 )-----------( 221      ( 20 Jul 2022 06:00 )             43.6                         13.2   6.55  )-----------( 222      ( 19 Jul 2022 19:45 )             41.1     07-20    136  |  100  |  17  ----------------------------<  161<H>  4.1   |  20<L>  |  0.88  07-19    141  |  105  |  19  ----------------------------<  107<H>  5.0   |  24  |  1.10    Ca    9.2      20 Jul 2022 06:00  Ca    9.6      19 Jul 2022 19:45  Phos  3.0     07-20  Mg     2.00     07-20    TPro  6.9  /  Alb  4.2  /  TBili  0.4  /  DBili  x   /  AST  17  /  ALT  14  /  AlkPhos  67  07-19    LIVER FUNCTIONS - ( 19 Jul 2022 19:45 )  Alb: 4.2 g/dL / Pro: 6.9 g/dL / ALK PHOS: 67 U/L / ALT: 14 U/L / AST: 17 U/L / GGT: x           Studies  ct< from: CT Angio Neck w/ IV Cont (07.19.22 @ 20:40) >  FINDINGS:    CT ANGIOGRAPHY NECK:    There is a bovine aortic arch. There are atherosclerotic calcifications   of the aortic arch and involving the proximal great vessels. Thereare   vascular stents in the distal left common carotid artery extending into   the proximal left internal carotid artery and within the proximal right   internal carotid artery. The vessels are patent within the vascular   stents. The cervical internal carotid arteries are patent. The cervical   vertebral arteries are patent from the origins to the skull base. The   right vertebral artery is slightly dominant.    The regional soft tissues of the neck are otherwise unremarkable. The   lung apicesare clear apart from a rounded nodular opacity in the right   upper lobe abutting the major fissure measuring 1.2 cm. There are   degenerative changes of the spine. Median sternotomy wires are present.    CT ANGIOGRAPHY BRAIN:    The skull base and intracranial carotid arteries are patent without   high-grade stenosis. There is mild luminal narrowing of the cavernous and   supraclinoid segments secondary to atherosclerotic calcification. The   proximal MCAs and ACAs are patent without significantstenosis. The   anterior communicating artery is visualized. Distal NATHALIE and MCA branches   are grossly symmetric.    The skull base and intradural vertebral arteries and basilar artery are   patent without significant stenosis. The proximal PCAs are patent without   significant stenosis. Hypoplastic right posterior communicating artery is   visualized. The superior cerebellar artery origins, a left AICA/PICA, a   right AICA, and a right PICA are identified.    There is no evidence of an intracranial arterial aneurysm or   arteriovenous malformation on CTA.    POST CONTRAST BRAIN:    High right frontal extra-axial homogeneously enhancing mass measuring 2.7   x 2.7 cm with surrounding vasogenic edema in the right frontal lobe.      IMPRESSION:    POSTCONTRAST CT HEAD: High right frontal extra-axial homogeneously   enhancing mass measuring 2.7 x 2.7 cm with surrounding vasogenic edema in   the right frontal lobe most likely representing a meningioma.    CTA NECK: Vascular stents in the distal left common carotid artery   extending into the proximal left internal carotid artery and within the   proximal right internal carotid artery. Vessels are patent within the   vascular stents. Patent cervical vertebral arteries.    Rounded nodular opacity in the right upper lobe abutting the major   fissure measuring 1.2 cm for which nonemergent chest CT is recommended.    CTA HEAD: No high-grade stenosis or occlusion of the major proximal   arterial branches.      < end of copied text >

## 2022-07-20 NOTE — H&P ADULT - NSHPLABSRESULTS_GEN_ALL_CORE
Labs:                        13.2   6.55  )-----------( 222      ( 19 Jul 2022 19:45 )             41.1     07-19    141  |  105  |  19  ----------------------------<  107<H>  5.0   |  24  |  1.10    Ca    9.6      19 Jul 2022 19:45    TPro  6.9  /  Alb  4.2  /  TBili  0.4  /  DBili  x   /  AST  17  /  ALT  14  /  AlkPhos  67  07-19      CARDIAC ENZYMES:  Troponin T, High Sensitivity: 40 ng/L (07-19-22 @ 21:57)  Troponin T, High Sensitivity: 12 ng/L (07-19-22 @ 19:45)    Creatine Kinase, Serum: 72 U/L (07-19-22 @ 21:57)      RADIOLOGY  CXR    CTH    CTA chest    CT C/T/L spine Labs:                        13.2   6.55  )-----------( 222      ( 19 Jul 2022 19:45 )             41.1     07-19    141  |  105  |  19  ----------------------------<  107<H>  5.0   |  24  |  1.10    Ca    9.6      19 Jul 2022 19:45    TPro  6.9  /  Alb  4.2  /  TBili  0.4  /  DBili  x   /  AST  17  /  ALT  14  /  AlkPhos  67  07-19      CARDIAC ENZYMES:  Troponin T, High Sensitivity: 40 ng/L (07-19-22 @ 21:57)  Troponin T, High Sensitivity: 12 ng/L (07-19-22 @ 19:45)    Creatine Kinase, Serum: 72 U/L (07-19-22 @ 21:57)    RADIOLOGY

## 2022-07-20 NOTE — CHART NOTE - NSCHARTNOTEFT_GEN_A_CORE
EEG reviewed until time of this note.     No seizures recorded.     Full report will be available tomorrow.

## 2022-07-20 NOTE — CONSULT NOTE ADULT - ASSESSMENT
89 y/o M with PMH of HTN, HLD, Afib on Xarelto, CAD s/p stents and CABG, pacemaker, h/o benign brain tumor, seizures (last reported seizure 3 months ago), who presented to the ED for syncope. While in ED, had witnessed generalized tonic clonic seizure, resolved with Ativan. On triage, O2 sats 100% on RA. CTH with R posterior frontal mass with vasogenic edema. Pulmonary called to consult for RUL nodular opacity seen on CTA Neck.

## 2022-07-20 NOTE — H&P ADULT - PROBLEM SELECTOR PLAN 7
- Continue crestor 5mg qd and Zetia 10mg qd  - lipid profile   - Low fat/ low cholesterol diet - Continue Crestor 5mg qd and Zetia 10mg qd  - lipid profile   - Low fat/ low cholesterol diet c/w Lexapro 5mg qd

## 2022-07-20 NOTE — H&P ADULT - NSICDXPASTMEDICALHX_GEN_ALL_CORE_FT
PAST MEDICAL HISTORY:  Brain mass     CAD (coronary artery disease)     H/O: depression     HLD (hyperlipidemia)     HTN (hypertension)

## 2022-07-20 NOTE — PHYSICAL THERAPY INITIAL EVALUATION ADULT - PERTINENT HX OF CURRENT PROBLEM, REHAB EVAL
89 year old Male with a PMHx significant for HTN, HLD, Afib, CAD s/p stents and quadruple bypass, pacemaker, h/o benign brain tumor, seizures (last reported seizure 3 months ago), who presented to the ED for syncope. While in the ED pt had a witnessed generalized tonic clonic seizure.

## 2022-07-20 NOTE — H&P ADULT - PROBLEM SELECTOR PLAN 4
Possible meningioma and likely source of seizures    -CT: Right posterior frontal mass 2.8 x 2.9 x 2.5 cm with extensive adjacent vasogenic edema in the   right frontal lobe.  -Neurosurgery consulted: appreciate recs   -Mri brain w/wo if PPM compatible  - decadron 4mg q4  - Obtain prior imaging from OSH for comparison  - d/w family [facility]

## 2022-07-20 NOTE — CHART NOTE - NSCHARTNOTEFT_GEN_A_CORE
Spoke with patient and patient's daughter Do at bedside. Patient and family questioning purpose of MRI studies, and were educated the usefulness of performing MRI head to further evaluate and characterise brain mass, and to further evaluate and characterize chest mass with MRI chest. Patient stated that he would not want brain surgery under any circumstance. Daughter stated she did not want MRI brain if the patient's plan would not involve future intervention. Patient and daughter agree that plan of this hospitalization is to address the seizure that brought him into the hospital and to be discharged on a safe anti-epileptic regiment to avoid future seizures and future hospitalizations. In regards to the new RUL nodular opacity (1.2 cm) seen on CTA neck, patient and family agreeable to undergoing a CT chest for further characterization. Will follow up results of this study and continue goals of care conversation at that point.    Patient with previous MOLST. New MOLST form was filled out on 7/20/22 and placed in chart.   Will continue to monitor the patient for seizure- like activity and follow results of vEEG. Discussed with Dr. Kirk Vanessa PA-C  Department of Medicine  Pager #[phone]

## 2022-07-20 NOTE — PROCEDURE NOTE - ADDITIONAL PROCEDURE DETAILS
Dual Chamber pacemaker in DDDR mode   Normal sensing and pacing via iterative testing  Excellent threshold capture  Episodes of PAF noted  No reprogramming  Pacemaker system is NOT MRI compatible

## 2022-07-20 NOTE — OCCUPATIONAL THERAPY INITIAL EVALUATION ADULT - GENERAL OBSERVATIONS, REHAB EVAL
Pt received semisupine, +mojica, +telemetry, +IV, +EEG, daughter at bedside and agreed to participate in OT.

## 2022-07-20 NOTE — H&P ADULT - PROBLEM SELECTOR PLAN 5
-s/p stents  - Continue statin   - Low fat, low cholesterol diet on Xarelto - hold for now   -c/w Sotalol 40mg bid.   -Monitor Qtc; on EKG 7/20 - 501

## 2022-07-20 NOTE — ED ADULT NURSE REASSESSMENT NOTE - NS ED NURSE REASSESS COMMENT FT1
PT Alert and responsive.  No distress noted.  Denies CP, no SOB noted.  PT confused and restless at times.   Re-direction provided.  Will continue to monitor.

## 2022-07-20 NOTE — PHYSICAL THERAPY INITIAL EVALUATION ADULT - LEVEL OF INDEPENDENCE, REHAB EVAL
functional mobility held at this time, receiving EEG and awaiting imaging results for hip/knee. Will assess as feasible/unable to perform

## 2022-07-20 NOTE — PATIENT PROFILE ADULT - FALL HARM RISK - HARM RISK INTERVENTIONS
Spoke with patient's wife, Wayne Sam. She is going go to hospice house at 1600 to sign consents and then visit with patient when he arrives.    Electronically signed by Adriana Xiong RN on 3/18/2021 at 2:04 PM Assistance with ambulation/Assistance OOB with selected safe patient handling equipment/Communicate Risk of Fall with Harm to all staff/Discuss with provider need for PT consult/Monitor gait and stability/Reinforce activity limits and safety measures with patient and family/Tailored Fall Risk Interventions/Visual Cue: Yellow wristband and red socks/Bed in lowest position, wheels locked, appropriate side rails in place/Call bell, personal items and telephone in reach/Instruct patient to call for assistance before getting out of bed or chair/Non-slip footwear when patient is out of bed/[place] to call system/Physically safe environment - no spills, clutter or unnecessary equipment/Purposeful Proactive Rounding/Room/bathroom lighting operational, light cord in reach

## 2022-07-20 NOTE — H&P ADULT - PROBLEM SELECTOR PLAN 8
-/102  - Continue Home BP meds w/ parameters   - monitor BP   - DASH/TLC diet - Continue Crestor 5mg qd and Zetia 10mg qd  - lipid profile   - Low fat/ low cholesterol diet

## 2022-07-20 NOTE — H&P ADULT - NS ATTEND AMEND GEN_ALL_CORE FT
I agree with the above H&P from ACP/Resident/Intern. In addition:  HPI:  Patient was lethargic and unable to provide answer for me at time of interview. hx obstained from chart review. 89y/o M with a Hx significant for HTN, HLD, Afib on Xarelto, CAD s/p stents and quadruple bypass, pacemaker, h/o benign brain tumor, seizures (last reported seizure 3 months ago), who presented to the ED for syncope. Information obtained on chart review as pt did not have any complaints and to "leave me alone" and "go away". Pt fell onto his left side; denies hitting his head, any HA, neck pain, visual disturbances, denies any aura or preceding symptoms, states that he did 1 hr of physical therapy this morning and was tired from it, denies any chest pain, sob, abdominal pain, urinary symptoms, numbness/weakness/tingling. Endorses left hip/knee pain.    While in the ED pt had a witnessed generalized tonic clonic seizure lasting two min and broke with 2mg ativan.     In ED: /79; HR 65, RR 18, SpO2 100% RA  s/p Ativan 2mg IVPx1 and decadron 4mg IVP x1     Labs: Reviewed  Imaging: Reviewed  EKG: , V-paced    PHYSICAL EXAM:  GENERAL: NAD, comfortable appearing  CHEST/LUNG: Clear to auscultation bilaterally; No wheezes, rales or rhonchi  HEART: Regular rate and rhythm; No murmurs, rubs, or gallops, (+)S1, S2  ABDOMEN: Soft, Nontender, Nondistended; Normal Bowel sounds   EXTREMITIES:  2+ Peripheral Pulses, No clubbing, cyanosis, or edema    A/P: 89y/o M with a Hx significant for HTN, HLD, Afib on Xarelto, CAD s/p stents and quadruple bypass, pacemaker, h/o benign brain tumor, seizures (last reported seizure 3 months ago), who presented to the ED for syncope. Admitted for syncope work up with neurogenic/epileptic etiology. Now CTH with enhancing lesion. Will need MRI but will need to clarify with family if AICD is MRI compatible. Continue Keppra for now. Routine syncope workup: Echo, Tele, EP interrogration for AICD.

## 2022-07-20 NOTE — CONSULT NOTE ADULT - NS ATTEND AMEND GEN_ALL_CORE FT
pt seen and examined : dw daughter:  he presented with sx and there is an incidental finding of pulm nodule in RUL: he used to smoke before: has no underlying malignancy:  await ct chest to decide about future course of addtion: : dw daughter

## 2022-07-20 NOTE — H&P ADULT - NSICDXPASTSURGICALHX_GEN_ALL_CORE_FT
PAST SURGICAL HISTORY:  Presence of permanent cardiac pacemaker      PAST SURGICAL HISTORY:  Presence of permanent cardiac pacemaker     S/P hip replacement, right     S/P quadruple vessel bypass

## 2022-07-20 NOTE — H&P ADULT - PROBLEM SELECTOR PLAN 3
-Witnessed seizure in ED s/p Ativan 2mg IVPx1 and decadron 4mg IVP x1   -Neurology consulted; appreciate recs   - Hold home lemictal 100mg qhs   -Give 20 mg/kg of Keppra stat. Followed by 1 gram BID starting tomorrow AM.   -EKG:    -VEEG ordered   -follow up with Neurosurgery, recs appreciated  -Start prednisone 4mg q4 as per Neurosurgery recs   - MRI brain with and without contrast -Witnessed seizure in ED s/p Ativan 2mg IVPx1 and decadron 4mg IVP x1   -Neurology consulted; appreciate recs   - Hold home lemictal 100mg qhs   -Give 20 mg/kg of Keppra stat. Followed by 1 gram BID starting tomorrow AM.   -VEEG ordered   -follow up with Neurosurgery, recs appreciated  -Start prednisone 4mg q4 as per Neurosurgery recs   - MRI brain with and without contrast -Witnessed seizure in ED s/p Ativan 2mg IVPx1 and decadron 4mg IVP x1   -Neurology consulted; appreciate recs   - Hold home lemictal 100mg qhs   -Give 20 mg/kg of Keppra stat. Followed by 1 gram BID starting tomorrow AM.   -VEEG ordered   -follow up with Neurosurgery, recs appreciated  -Start prednisone 4mg q4 as per Neurosurgery recs   - MRI brain with and without contrast---if PPM compatible   -dysphagia screen pending -Witnessed seizure in ED s/p Ativan 2mg IVPx1 and decadron 4mg IVP x1   -Neurology consulted; appreciate recs   - Hold home Lamictal 100mg qhs   -Give 20 mg/kg of Keppra stat. Followed by 1 gram BID starting tomorrow AM.   -VEEG ordered   -follow up with Neurosurgery, recs appreciated  -Start decadron 4mg q4 as per Neurosurgery recs   - MRI brain with and without contrast---if PPM compatible   -dysphagia screen pending

## 2022-07-20 NOTE — H&P ADULT - NSHPREVIEWOFSYSTEMS_GEN_ALL_CORE
CONSTITUTIONAL: No weakness, fevers or chills  EYES/ENT: No visual changes;  No vertigo or throat pain   NECK: No pain or stiffness  RESPIRATORY: No cough, wheezing, hemoptysis; No shortness of breath  CARDIOVASCULAR: No chest pain or palpitations  GASTROINTESTINAL: No abdominal or epigastric pain. No nausea, vomiting, or hematemesis; No diarrhea or constipation. No melena or hematochezia.  GENITOURINARY: No dysuria, frequency or hematuria  Musculoskeletal: + Left hip/knee pain,   NEUROLOGICAL: No numbness or weakness  SKIN: No itching, rashes

## 2022-07-20 NOTE — PATIENT PROFILE ADULT - NS PRO AD ANY ON CHART
Yes
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  she has tried diclofenac gel, prednisone  and Plaquenil which did not work.   she tried OT however this exacerbated her pain and now has adversity to try it again. she saw hand surgery and had injections to her 1st IP and 4th pip with some pain relief.  she is on  cymbalta 60mg and she feels  like her pain is better.  she requires naproxen 3 times a week.  she s taking omeprazole with her naproxen\par \par Patient denies any psoriasis , FH psoriasis,.  No IBD s/s and no FH of Crohns or UC.  patient denies an history of STDs.  No issues with Achilles or plantar foot.  NO other joint issue stat the present time.  \par \par morning stiffness of the hands for about 1 hour.  No other joint stiffness \par \par  patient is noted to have negative CCP/RF/HLA-b27 and normal inflammatory markers.

## 2022-07-20 NOTE — H&P ADULT - PROBLEM SELECTOR PLAN 1
- Likely 2/2 seizure vs. vasovagal vs. orthostatic vs. underlying cardiac arrhythmia  - TTE  - EKG:   - CT results  - Orthostatic vitals  - TTE pending  - +/- carotid dopplers  - Consider EP consult for PPM interrogation   - Telemetry monitoring  - Fall precautions, aspiration precautions,  - PT eval - Likely 2/2 seizure vs. vasovagal vs. orthostatic vs. underlying cardiac arrhythmia  - EKG: V paced   - CT results: Right posterior frontal mass 2.8 x 2.9 x 2.5 cm near the falx suggesting meningioma with extensive adjacent vasogenic edema in the right frontal lobe.  - TTE pending  - EP consult for PPM interrogation   - Telemetry monitoring  - Fall precautions, aspiration precautions,  - PT eval - Likely 2/2 seizure vs. vasovagal vs. orthostatic vs. underlying cardiac arrhythmia  - EKG: V paced   - CT results: Right posterior frontal mass 2.8 x 2.9 x 2.5 cm near the falx suggesting meningioma with extensive adjacent vasogenic edema in the right frontal lobe.  - Obtain prior imaging from OSH for comparison  - TTE pending  - EP consult for PPM interrogation   - Telemetry monitoring  - Fall precautions, aspiration precautions,  - PT eval

## 2022-07-20 NOTE — CONSULT NOTE ADULT - PROBLEM SELECTOR RECOMMENDATION 4
Pt with hx of benign brain mass per daughter  -CTH now with R posterior frontal mass with vasogenic edema, likely meningioma  -Steroids per neurosurgery  -Neuro, neurosurgery f/u.

## 2022-07-20 NOTE — PHYSICAL THERAPY INITIAL EVALUATION ADULT - ADDITIONAL COMMENTS
(+) pt. owns a cane at home     Pt was receiving [facility] at [facility]    Pt. left comfortable in bed with all tubes/lines intact, call bell in reach and in NAD.

## 2022-07-20 NOTE — H&P ADULT - ASSESSMENT
89y/o M with a Hx significant for HTN, HLD, Afib on Xarelto, CAD s/p stents and quadruple bypass, pacemaker, h/o benign brain tumor, seizures (last reported seizure 3 months ago), who presented to the ED for syncope complicated by tonic clonic seizure

## 2022-07-21 ENCOUNTER — TRANSCRIPTION ENCOUNTER (OUTPATIENT)
Age: 88
End: 2022-07-21

## 2022-07-21 VITALS
RESPIRATION RATE: 17 BRPM | DIASTOLIC BLOOD PRESSURE: 61 MMHG | TEMPERATURE: 97 F | OXYGEN SATURATION: 100 % | SYSTOLIC BLOOD PRESSURE: 141 MMHG

## 2022-07-21 LAB
ALBUMIN SERPL ELPH-MCNC: 4.3 G/DL — SIGNIFICANT CHANGE UP (ref 3.3–5)
ALP SERPL-CCNC: 73 U/L — SIGNIFICANT CHANGE UP (ref 40–120)
ALT FLD-CCNC: 11 U/L — SIGNIFICANT CHANGE UP (ref 4–41)
ANION GAP SERPL CALC-SCNC: 15 MMOL/L — HIGH (ref 7–14)
AST SERPL-CCNC: 17 U/L — SIGNIFICANT CHANGE UP (ref 4–40)
BILIRUB SERPL-MCNC: 0.6 MG/DL — SIGNIFICANT CHANGE UP (ref 0.2–1.2)
BUN SERPL-MCNC: 23 MG/DL — SIGNIFICANT CHANGE UP (ref 7–23)
CALCIUM SERPL-MCNC: 9.7 MG/DL — SIGNIFICANT CHANGE UP (ref 8.4–10.5)
CHLORIDE SERPL-SCNC: 101 MMOL/L — SIGNIFICANT CHANGE UP (ref 98–107)
CO2 SERPL-SCNC: 21 MMOL/L — LOW (ref 22–31)
CREAT SERPL-MCNC: 1.01 MG/DL — SIGNIFICANT CHANGE UP (ref 0.5–1.3)
EGFR: 70 ML/MIN/1.73M2 — SIGNIFICANT CHANGE UP
GLUCOSE SERPL-MCNC: 151 MG/DL — HIGH (ref 70–99)
HCT VFR BLD CALC: 47.9 % — SIGNIFICANT CHANGE UP (ref 39–50)
HGB BLD-MCNC: 15.2 G/DL — SIGNIFICANT CHANGE UP (ref 13–17)
MAGNESIUM SERPL-MCNC: 2.3 MG/DL — SIGNIFICANT CHANGE UP (ref 1.6–2.6)
MCHC RBC-ENTMCNC: 29.5 PG — SIGNIFICANT CHANGE UP (ref 27–34)
MCHC RBC-ENTMCNC: 31.7 GM/DL — LOW (ref 32–36)
MCV RBC AUTO: 93 FL — SIGNIFICANT CHANGE UP (ref 80–100)
NRBC # BLD: 0 /100 WBCS — SIGNIFICANT CHANGE UP
NRBC # FLD: 0 K/UL — SIGNIFICANT CHANGE UP
PHOSPHATE SERPL-MCNC: 3.8 MG/DL — SIGNIFICANT CHANGE UP (ref 2.5–4.5)
PLATELET # BLD AUTO: 249 K/UL — SIGNIFICANT CHANGE UP (ref 150–400)
POTASSIUM SERPL-MCNC: 4.6 MMOL/L — SIGNIFICANT CHANGE UP (ref 3.5–5.3)
POTASSIUM SERPL-SCNC: 4.6 MMOL/L — SIGNIFICANT CHANGE UP (ref 3.5–5.3)
PROT SERPL-MCNC: 7 G/DL — SIGNIFICANT CHANGE UP (ref 6–8.3)
RBC # BLD: 5.15 M/UL — SIGNIFICANT CHANGE UP (ref 4.2–5.8)
RBC # FLD: 13.7 % — SIGNIFICANT CHANGE UP (ref 10.3–14.5)
SODIUM SERPL-SCNC: 137 MMOL/L — SIGNIFICANT CHANGE UP (ref 135–145)
WBC # BLD: 13.96 K/UL — HIGH (ref 3.8–10.5)
WBC # FLD AUTO: 13.96 K/UL — HIGH (ref 3.8–10.5)

## 2022-07-21 PROCEDURE — 93306 TTE W/DOPPLER COMPLETE: CPT | Mod: 26

## 2022-07-21 RX ORDER — ESCITALOPRAM OXALATE 10 MG/1
2 TABLET, FILM COATED ORAL
Qty: 0 | Refills: 0 | DISCHARGE

## 2022-07-21 RX ORDER — ESCITALOPRAM OXALATE 10 MG/1
1 TABLET, FILM COATED ORAL
Qty: 0 | Refills: 0 | DISCHARGE

## 2022-07-21 RX ORDER — LAMOTRIGINE 25 MG/1
1 TABLET, ORALLY DISINTEGRATING ORAL
Qty: 0 | Refills: 0 | DISCHARGE

## 2022-07-21 RX ORDER — EZETIMIBE 10 MG/1
1 TABLET ORAL
Qty: 0 | Refills: 0 | DISCHARGE

## 2022-07-21 RX ORDER — DEXAMETHASONE 0.5 MG/5ML
1 ELIXIR ORAL
Qty: 15 | Refills: 0
Start: 2022-07-21 | End: 2022-07-25

## 2022-07-21 RX ORDER — ROSUVASTATIN CALCIUM 5 MG/1
1 TABLET ORAL
Qty: 0 | Refills: 0 | DISCHARGE

## 2022-07-21 RX ORDER — LEVETIRACETAM 250 MG/1
1 TABLET, FILM COATED ORAL
Qty: 60 | Refills: 0
Start: 2022-07-21 | End: 2022-08-19

## 2022-07-21 RX ORDER — DEXAMETHASONE 0.5 MG/5ML
1 ELIXIR ORAL
Qty: 10 | Refills: 0
Start: 2022-07-21 | End: 2022-07-25

## 2022-07-21 RX ORDER — DEXAMETHASONE 0.5 MG/5ML
1 ELIXIR ORAL
Qty: 0 | Refills: 0 | DISCHARGE
Start: 2022-07-21

## 2022-07-21 RX ORDER — FONDAPARINUX SODIUM 2.5 MG/.5ML
1 INJECTION, SOLUTION SUBCUTANEOUS
Qty: 0 | Refills: 0 | DISCHARGE

## 2022-07-21 RX ORDER — ESCITALOPRAM OXALATE 10 MG/1
1 TABLET, FILM COATED ORAL
Qty: 0 | Refills: 0 | DISCHARGE
Start: 2022-07-21

## 2022-07-21 RX ORDER — SOTALOL HCL 120 MG
40 TABLET ORAL
Qty: 0 | Refills: 0 | DISCHARGE

## 2022-07-21 RX ORDER — DEXAMETHASONE 0.5 MG/5ML
1 ELIXIR ORAL
Qty: 5 | Refills: 0
Start: 2022-07-21 | End: 2022-07-25

## 2022-07-21 RX ORDER — DEXAMETHASONE 0.5 MG/5ML
4 ELIXIR ORAL EVERY 8 HOURS
Refills: 0 | Status: DISCONTINUED | OUTPATIENT
Start: 2022-07-21 | End: 2022-07-21

## 2022-07-21 RX ORDER — DILTIAZEM HCL 120 MG
1 CAPSULE, EXT RELEASE 24 HR ORAL
Qty: 0 | Refills: 0 | DISCHARGE

## 2022-07-21 RX ADMIN — ENOXAPARIN SODIUM 40 MILLIGRAM(S): 100 INJECTION SUBCUTANEOUS at 06:53

## 2022-07-21 RX ADMIN — Medication 4 MILLIGRAM(S): at 02:31

## 2022-07-21 RX ADMIN — Medication 4 MILLIGRAM(S): at 09:15

## 2022-07-21 RX ADMIN — LEVETIRACETAM 1000 MILLIGRAM(S): 250 TABLET, FILM COATED ORAL at 06:53

## 2022-07-21 RX ADMIN — Medication 40 MILLIGRAM(S): at 06:53

## 2022-07-21 RX ADMIN — Medication 4 MILLIGRAM(S): at 06:53

## 2022-07-21 RX ADMIN — Medication 4 MILLIGRAM(S): at 13:08

## 2022-07-21 NOTE — PROGRESS NOTE ADULT - ASSESSMENT
89 y/o M with PMH of HTN, HLD, Afib on Xarelto, CAD s/p stents and CABG, pacemaker, h/o benign brain tumor, seizures (last reported seizure 3 months ago), who presented to the ED for syncope. While in ED, had witnessed generalized tonic clonic seizure, resolved with Ativan. On triage, O2 sats 100% on RA. CTH with R posterior frontal mass with vasogenic edema. Pulmonary called to consult for RUL nodular opacity seen on CTA Neck.
 89y/o M with a Hx significant for HTN, HLD, Afib on Xarelto, CAD s/p stents and quadruple bypass, pacemaker, h/o benign brain tumor, seizures (last reported seizure 3 months ago), who presented to the ED for syncope complicated by tonic clonic seizure

## 2022-07-21 NOTE — PROGRESS NOTE ADULT - PROBLEM SELECTOR PLAN 4
see above  discussed with Neurosurgery  dexamethasone taper  continue AED  no further work up as per family and pt

## 2022-07-21 NOTE — DISCHARGE NOTE NURSING/CASE MANAGEMENT/SOCIAL WORK - NSDCPEFALRISK_GEN_ALL_CORE
For information on Fall & Injury Prevention, visit: [email or web address] OR  [email or web address] OR  https://www.cdc.gov/steadi/patient.html

## 2022-07-21 NOTE — PROGRESS NOTE ADULT - SUBJECTIVE AND OBJECTIVE BOX
patient not known to me, assigned this AM to assume care  chart reviewed and events thus far noted  admitted overnight by hospitalist colleague    PE as above  detailed discussion with daughter Do    plan for pulmonary consultation   daughter to decide re further work up    discussed with daughter at bedside in detail  discussed with covering ACP 
Date of Service: 07-21-22 @ 11:12    Patient is a 89y old  Male who presents with a chief complaint of Syncope (20 Jul 2022 13:43)      Any change in ROS: seems K: he is refusing for ct scan chest today :  he is on room air:   alert and awake:       MEDICATIONS  (STANDING):  atorvastatin 20 milliGRAM(s) Oral at bedtime  dexAMETHasone     Tablet 4 milliGRAM(s) Oral every 8 hours  enoxaparin Injectable 40 milliGRAM(s) SubCutaneous every 24 hours  escitalopram 5 milliGRAM(s) Oral at bedtime  levETIRAcetam 1000 milliGRAM(s) Oral two times a day  sotalol 40 milliGRAM(s) Oral every 12 hours    MEDICATIONS  (PRN):  acetaminophen     Tablet .. 650 milliGRAM(s) Oral every 6 hours PRN Temp greater or equal to 38C (100.4F), Mild Pain (1 - 3)    Vital Signs Last 24 Hrs  T(C): 36.3 (21 Jul 2022 08:38), Max: 36.7 (20 Jul 2022 20:52)  T(F): 97.3 (21 Jul 2022 08:38), Max: 98 (20 Jul 2022 20:52)  HR: 78 (21 Jul 2022 06:45) (66 - 78)  BP: 141/61 (21 Jul 2022 08:38) (119/71 - 145/77)  BP(mean): --  RR: 17 (21 Jul 2022 08:38) (17 - 18)  SpO2: 100% (21 Jul 2022 08:38) (96% - 100%)    Parameters below as of 21 Jul 2022 08:38  Patient On (Oxygen Delivery Method): nasal cannula  O2 Flow (L/min): 2      I&O's Summary        Physical Exam:   GENERAL: NAD, well-groomed, well-developed  HEENT: ANDREY/   Atraumatic, Normocephalic  ENMT: No tonsillar erythema, exudates, or enlargement; Moist mucous membranes, Good dentition, No lesions  NECK: Supple, No JVD, Normal thyroid  CHEST/LUNG: Clear to auscultaion, ; No rales, rhonchi, wheezing, or rubs  CVS: Regular rate and rhythm; No murmurs, rubs, or gallops  GI: : Soft, Nontender, Nondistended; Bowel sounds present  NERVOUS SYSTEM:  Alert & Oriented X3  EXTREMITIES:  2+ Peripheral Pulses, No clubbing, cyanosis, or edema  LYMPH: No lymphadenopathy noted  SKIN: No rashes or lesions  ENDOCRINOLOGY: No Thyromegaly  PSYCH: Appropriate    Labs:    CARDIAC MARKERS ( 19 Jul 2022 21:57 )  x     / x     / 72 U/L / x     / 3.5 ng/mL                            15.2   13.96 )-----------( 249      ( 21 Jul 2022 06:15 )             47.9                         14.6   10.53 )-----------( 221      ( 20 Jul 2022 06:00 )             43.6                         13.2   6.55  )-----------( 222      ( 19 Jul 2022 19:45 )             41.1     07-21    137  |  101  |  23  ----------------------------<  151<H>  4.6   |  21<L>  |  1.01  07-20    136  |  100  |  17  ----------------------------<  161<H>  4.1   |  20<L>  |  0.88  07-19    141  |  105  |  19  ----------------------------<  107<H>  5.0   |  24  |  1.10    Ca    9.7      21 Jul 2022 06:15  Ca    9.2      20 Jul 2022 06:00  Ca    9.6      19 Jul 2022 19:45  Phos  3.8     07-21  Phos  3.0     07-20  Mg     2.30     07-21  Mg     2.00     07-20    TPro  7.0  /  Alb  4.3  /  TBili  0.6  /  DBili  x   /  AST  17  /  ALT  11  /  AlkPhos  73  07-21  TPro  6.9  /  Alb  4.2  /  TBili  0.4  /  DBili  x   /  AST  17  /  ALT  14  /  AlkPhos  67  07-19    CAPILLARY BLOOD GLUCOSE          LIVER FUNCTIONS - ( 21 Jul 2022 06:15 )  Alb: 4.3 g/dL / Pro: 7.0 g/dL / ALK PHOS: 73 U/L / ALT: 11 U/L / AST: 17 U/L / GGT: x             rad< from: CT Angio Neck w/ IV Cont (07.19.22 @ 20:40) >    IMPRESSION:    POSTCONTRAST CT HEAD: High right frontal extra-axial homogeneously   enhancing mass measuring 2.7 x 2.7 cm with surrounding vasogenic edema in   the right frontal lobe most likely representing a meningioma.    CTA NECK: Vascular stents in the distal left common carotid artery   extending into the proximal left internal carotid artery and within the   proximal right internal carotid artery. Vessels are patent within the   vascular stents. Patent cervical vertebral arteries.    Rounded nodular opacity in the right upper lobe abutting the major   fissure measuring 1.2 cm for which nonemergent chest CT is recommended.    CTA HEAD: No high-grade stenosis or occlusion of the major proximal   arterial branches.    --- End of Report ---          KALANI HARO MD; Resident Radiologist  This document has been electronically signed.  FLOR CASTRO DO; Attending Radiologist  This document has been electronically signed. Jul 19 2022  9:48PM    < end of copied text >          RECENT CULTURES:        RESPIRATORY CULTURES:          Studies  Chest X-RAY  CT SCAN Chest   Venous Dopplers: LE:   CT Abdomen  Others              
Patient is a 89y old  Male who presents with a chief complaint of Syncope (21 Jul 2022 11:44)      DATE OF SERVICE: 07-21-22 @ 12:08    SUBJECTIVE / OVERNIGHT EVENTS: overnight events noted  "I feel fine'    ROS:  Resp: No cough no sputum production  CVS: No chest pain no palpitations no orthopnea  GI: no N/V/D  : no dysuria, no hematuria          MEDICATIONS  (STANDING):  atorvastatin 20 milliGRAM(s) Oral at bedtime  dexAMETHasone     Tablet 4 milliGRAM(s) Oral every 8 hours  enoxaparin Injectable 40 milliGRAM(s) SubCutaneous every 24 hours  escitalopram 5 milliGRAM(s) Oral at bedtime  levETIRAcetam 1000 milliGRAM(s) Oral two times a day  sotalol 40 milliGRAM(s) Oral every 12 hours    MEDICATIONS  (PRN):  acetaminophen     Tablet .. 650 milliGRAM(s) Oral every 6 hours PRN Temp greater or equal to 38C (100.4F), Mild Pain (1 - 3)        CAPILLARY BLOOD GLUCOSE        I&O's Summary      Vital Signs Last 24 Hrs  T(C): 36.3 (21 Jul 2022 08:38), Max: 36.7 (20 Jul 2022 20:52)  T(F): 97.3 (21 Jul 2022 08:38), Max: 98 (20 Jul 2022 20:52)  HR: 78 (21 Jul 2022 06:45) (66 - 78)  BP: 141/61 (21 Jul 2022 08:38) (119/71 - 145/77)  BP(mean): --  RR: 17 (21 Jul 2022 08:38) (17 - 18)  SpO2: 100% (21 Jul 2022 08:38) (96% - 100%)    PHYSICAL EXAM:  GENERAL: in no apparent distress  HEAD:  Atraumatic, Normocephalic  EYES: EOMI, PERRLA, sclera clear  NECK: Supple, No JVD  CHEST/LUNG: clear b/l, no wheeze  HEART: S1 S2; no murmurs appreciated  ABDOMEN: Soft, Nontender, Bowel sounds present  EXTREMITIES:  No clubbing or cyanosis,  no edema  NEUROLOGY: AO x 3 non-focal  SKIN: No rashes or lesions    LABS:                        15.2   13.96 )-----------( 249      ( 21 Jul 2022 06:15 )             47.9     07-21    137  |  101  |  23  ----------------------------<  151<H>  4.6   |  21<L>  |  1.01    Ca    9.7      21 Jul 2022 06:15  Phos  3.8     07-21  Mg     2.30     07-21    TPro  7.0  /  Alb  4.3  /  TBili  0.6  /  DBili  x   /  AST  17  /  ALT  11  /  AlkPhos  73  07-21      CARDIAC MARKERS ( 19 Jul 2022 21:57 )  x     / x     / 72 U/L / x     / 3.5 ng/mL            All consultant(s) notes reviewed and care discussed with other providers        Contact Number, Dr Bradley [phone]

## 2022-07-21 NOTE — PROGRESS NOTE ADULT - PROBLEM SELECTOR PLAN 2
PT evaluation pending  likely outpatient PT at the [facility]  X-rays negative for fracture
ct head noted: has mass with vasogenic edema: on steroids and anti sz medications::  on steroids : hence WBC increased: he is afebrile:

## 2022-07-21 NOTE — DISCHARGE NOTE NURSING/CASE MANAGEMENT/SOCIAL WORK - PATIENT PORTAL LINK FT
You can access the FollowMyHealth Patient Portal offered by [facility] by registering at the following website: [email or web address]. By joining [employer]’s FollowMyHealth portal, you will also be able to view your health information using other applications (apps) compatible with our system.

## 2022-07-21 NOTE — PROGRESS NOTE ADULT - CONVERSATION DETAILS
detailed discussed with patient re all of above options  the patient is AO x 3 and quite clear in stated wishes of not wanting intubation or chest compression    does want other non-heroic measures including IVF, antibiotics etc    MOLST filled and placed in chart    DNR ordered in [place]    30 minutes for advanced care planning discussion separate and in addition to the E&M service provided

## 2022-07-21 NOTE — DISCHARGE NOTE PROVIDER - CARE PROVIDERS DIRECT ADDRESSES
,DirectAddress_Unknown,[email or web address] ,DirectAddress_Unknown,[email or web address],DirectAddress_Unknown,[email or web address]

## 2022-07-21 NOTE — EEG REPORT - NS EEG TEXT BOX
TOBIN MALDONADO [id number]     Study Date: 		07-20-22 10:44 to 07-21-22 08:00  Duration x Hours: 20:43 hrs  --------------------------------------------------------------------------------------------------  History:  CC/ HPI Patient is a 89y old  Male who presents with a chief complaint of Syncope (21 Jul 2022 12:08)    MEDICATIONS  (STANDING):  atorvastatin 20 milliGRAM(s) Oral at bedtime  dexAMETHasone     Tablet 4 milliGRAM(s) Oral every 8 hours  enoxaparin Injectable 40 milliGRAM(s) SubCutaneous every 24 hours  escitalopram 5 milliGRAM(s) Oral at bedtime  levETIRAcetam 1000 milliGRAM(s) Oral two times a day  sotalol 40 milliGRAM(s) Oral every 12 hours    --------------------------------------------------------------------------------------------------  Study Interpretation:    [[[Abbreviation Key:  PDR=alpha rhythm/posterior dominant rhythm. A-P=anterior posterior.  Amplitude: ‘very low’:<20; ‘low’:20-49; ‘medium’:; ‘high’:>150uV.  Persistence for periodic/rhythmic patterns (% of epoch) ‘rare’:<1%; ‘occasional’:1-10%; ‘frequent’:10-50%; ‘abundant’:50-90%; ‘continuous’:>90%.  Persistence for sporadic discharges: ‘rare’:<1/hr; ‘occasional’:1/min-1/hr; ‘frequent’:>1/min; ‘abundant’:>1/10 sec.  RPP=rhythmic and periodic patterns; GRDA=generalized rhythmic delta activity; FIRDA=frontal intermittent GRDA; LRDA=lateralized rhythmic delta activity; TIRDA=temporal intermittent rhythmic delta activity;  LPD=PLED=lateralized periodic discharges; GPD=generalized periodic discharges; BIPDs =bilateral independent periodic discharges; Mf=multifocal; SIRPDs=stimulus induced rhythmic, periodic, or ictal appearing discharges; BIRDs=brief potentially ictal rhythmic discharges >4 Hz, lasting .5-10s; PFA (paroxysmal bursts >13 Hz or =8 Hz <10s).  Modifiers: +F=with fast component; +S=with spike component; +R=with rhythmic component.  S-B=burst suppression pattern.  Max=maximal. N1-drowsy; N2-stage II sleep; N3-slow wave sleep. SSS/BETS=small sharp spikes/benign epileptiform transients of sleep. HV=hyperventilation; PS=photic stimulation]]]    Daily EEG Visual Analysis    FINDINGS:      Background:  Continuous: continuous  Symmetry: symmetric  PDR: 8-8.5 Hz activity, with amplitude to 40 uV, that attenuated to eye opening.  Low amplitude frontal beta noted in wakefulness.  Reactivity: present  Voltage: normal, mostly 20-150uV  Anterior Posterior Gradient: present  Other background findings: none  Breach: absent    Background Slowing:  Generalized slowing: Intermittent theta even during wakefulness   Focal slowing: none was present.    State Changes:   -Drowsiness noted with increased slowing, attenuation of fast activity, vertex transients.  -Present with N2 sleep transients with symmetric spindles and K-complexes.    Sporadic Epileptiform Discharges:    None    Rhythmic and Periodic Patterns (RPPs):  None     Electrographic and Electroclinical seizures:  None    Other Clinical Events:  None    Activation Procedures:   -Hyperventilation was not performed.    -Photic stimulation was not performed.  -Photic stimulation was performed and did not elicit any abnormalities.      Artifacts:  Intermittent myogenic and movement artifacts were noted.    ECG:  The heart rate on single channel ECG was predominantly between 60-80 BPM.    EEG Classification / Summary:  Abnormal  EEG in the awake / drowsy / asleep state  Background slowing, generalized, mild    Clinical Impression:  Mild diffuse/multifocal cerebral dysfunction, not specific as to etiology  There were no epileptiform abnormalities and seizures recorded.      This is a prelim report only, pending review with attending prior to finalization.    -------------------------------------------------------------------------------------------------------  [facility] EEG Reading Room Ph#: [phone]  Epilepsy Answering Service after 5PM and before 8:30AM: Ph#: [phone]    Iglesia Sánchez M.D.   Epilepsy fellow TOBIN MALDONADO [id number]     Study Date: 		07-20-22 10:44 to 07-21-22 08:00  Duration x Hours: 20:43 hrs  --------------------------------------------------------------------------------------------------  History:  CC/ HPI Patient is a 89y old  Male who presents with a chief complaint of Syncope (21 Jul 2022 12:08)    MEDICATIONS  (STANDING):  atorvastatin 20 milliGRAM(s) Oral at bedtime  dexAMETHasone     Tablet 4 milliGRAM(s) Oral every 8 hours  enoxaparin Injectable 40 milliGRAM(s) SubCutaneous every 24 hours  escitalopram 5 milliGRAM(s) Oral at bedtime  levETIRAcetam 1000 milliGRAM(s) Oral two times a day  sotalol 40 milliGRAM(s) Oral every 12 hours    --------------------------------------------------------------------------------------------------  Study Interpretation:    [[[Abbreviation Key:  PDR=alpha rhythm/posterior dominant rhythm. A-P=anterior posterior.  Amplitude: ‘very low’:<20; ‘low’:20-49; ‘medium’:; ‘high’:>150uV.  Persistence for periodic/rhythmic patterns (% of epoch) ‘rare’:<1%; ‘occasional’:1-10%; ‘frequent’:10-50%; ‘abundant’:50-90%; ‘continuous’:>90%.  Persistence for sporadic discharges: ‘rare’:<1/hr; ‘occasional’:1/min-1/hr; ‘frequent’:>1/min; ‘abundant’:>1/10 sec.  RPP=rhythmic and periodic patterns; GRDA=generalized rhythmic delta activity; FIRDA=frontal intermittent GRDA; LRDA=lateralized rhythmic delta activity; TIRDA=temporal intermittent rhythmic delta activity;  LPD=PLED=lateralized periodic discharges; GPD=generalized periodic discharges; BIPDs =bilateral independent periodic discharges; Mf=multifocal; SIRPDs=stimulus induced rhythmic, periodic, or ictal appearing discharges; BIRDs=brief potentially ictal rhythmic discharges >4 Hz, lasting .5-10s; PFA (paroxysmal bursts >13 Hz or =8 Hz <10s).  Modifiers: +F=with fast component; +S=with spike component; +R=with rhythmic component.  S-B=burst suppression pattern.  Max=maximal. N1-drowsy; N2-stage II sleep; N3-slow wave sleep. SSS/BETS=small sharp spikes/benign epileptiform transients of sleep. HV=hyperventilation; PS=photic stimulation]]]    Daily EEG Visual Analysis    FINDINGS:      Background:  Continuous: continuous  Symmetry: symmetric  PDR: 8-8.5 Hz activity, with amplitude to 40 uV, that attenuated to eye opening.  Low amplitude frontal beta noted in wakefulness.  Reactivity: present  Voltage: normal, mostly 20-150uV  Anterior Posterior Gradient: present  Other background findings: none  Breach: absent    Background Slowing:  Generalized slowing: Intermittent theta even during wakefulness   Focal slowing: none was present.    State Changes:   -Drowsiness noted with increased slowing, attenuation of fast activity, vertex transients.  -Present with N2 sleep transients with symmetric spindles and K-complexes.    Sporadic Epileptiform Discharges:    None    Rhythmic and Periodic Patterns (RPPs):  None     Electrographic and Electroclinical seizures:  None    Other Clinical Events:  None    Activation Procedures:   -Hyperventilation was not performed.    -Photic stimulation was not performed.    Artifacts:  Intermittent myogenic and movement artifacts were noted.    ECG:  The heart rate on single channel ECG was predominantly between 60-80 BPM.    EEG Classification / Summary:  Abnormal  EEG in the awake / drowsy / asleep state  Background slowing, generalized, mild    Clinical Impression:  Mild diffuse/multifocal cerebral dysfunction, not specific as to etiology  There were no epileptiform abnormalities and seizures recorded.      -------------------------------------------------------------------------------------------------------  [facility] EEG Reading Room Ph#: [phone]  Epilepsy Answering Service after 5PM and before 8:30AM: Ph#: [phone]    Iglesia Sánchez M.D.   Epilepsy fellow

## 2022-07-21 NOTE — PROGRESS NOTE ADULT - PROBLEM SELECTOR PLAN 3
Cont anti sz medications
discussed with daughter  discontinue Lamictal  continue Keppra 1000 BID on discharge

## 2022-07-21 NOTE — DISCHARGE NOTE PROVIDER - CARE PROVIDER_API CALL
Amanda Luque)  Neurology  General  [address] [place] [address]  Phone: [phone]  Fax: [phone]  Follow Up Time: 1 week    Evelyn Du)  Neurosurgery  [address] [facility] [facility], [address]  [place] [address]  Phone: [phone]  Fax: [phone]  Follow Up Time: 1 week   Amanda Luque)  Neurology  General  [address] [place] [address]  Phone: [phone]  Fax: [phone]  Follow Up Time: 1 week    Evelyn Du)  Neurosurgery  [address] [facility], [address]  [place] [address]  Phone: [phone]  Fax: [phone]  Follow Up Time: 1 week    German Bradley)  Internal Medicine  [address] [place] [address]  Phone: [phone]  Fax: [phone]  Follow Up Time: 1 week    Gabe Torres)  Critical Care Medicine; Internal Medicine; Pulmonary Disease  [address] [place] [address]  Phone: [phone]  Fax: [phone]  Follow Up Time: 1 week

## 2022-07-21 NOTE — DISCHARGE NOTE PROVIDER - PROVIDER TOKENS
PROVIDER:[TOKEN:[[id number]],FOLLOWUP:[1 week]],PROVIDER:[TOKEN:[[id number]],FOLLOWUP:[1 week]] PROVIDER:[TOKEN:[[id number]],FOLLOWUP:[1 week]],PROVIDER:[TOKEN:[[id number]],FOLLOWUP:[1 week]],PROVIDER:[TOKEN:[[id number]],FOLLOWUP:[1 week]],PROVIDER:[TOKEN:[[id number]],FOLLOWUP:[1 week]]

## 2022-07-21 NOTE — DISCHARGE NOTE PROVIDER - HOSPITAL COURSE
89y/o M with a Hx significant for HTN, HLD, Afib on Xarelto, CAD s/p stents and quadruple bypass, pacemaker, h/o benign brain tumor, seizures (last reported seizure 3 months ago), who presented to the ED for syncope complicated by tonic clonic seizure whlie in the ED which responded to Ativan 2mg IV. EKG V-paced.  PPM interrogated revealing PAF episodes. CT Brain revealing Right posterior frontal mass 2.8 x 2.9 x 2.5 cm near the falx suggesting meningioma with extensive adjacent vasogenic edema in the right frontal lobe. Patient and family declined MRI for further evaluation of mass. Also noted with lung mass on CTA Neck however patient and family also declined dedicated CT Chest. Patient seen by Neurosurgery & Neurology and started on IV Decadron- will continue to taper after discharge. On 7/21/22, case d/w Dr. Bradley. Patient stable for DC back to [facility].

## 2022-07-21 NOTE — DISCHARGE NOTE PROVIDER - NSDCMRMEDTOKEN_GEN_ALL_CORE_FT
Crestor 5 mg oral tablet: 1 tab(s) orally once a day  dexamethasone 4 mg oral tablet: 1 tab(s) orally every 8 hours (7/21 - 7/25)  dexamethasone 4 mg oral tablet: 1 tab(s) orally 2 times a day (from 7/26-7/30)  dexamethasone 4 mg oral tablet: 1 tab(s) orally once a day (7/31 - 8/4)  dilTIAZem 120 mg oral tablet: 1 tab(s) orally once a day  escitalopram 5 mg oral tablet: 1 tab(s) orally once a day (at bedtime)  levETIRAcetam 1000 mg oral tablet: 1 tab(s) orally 2 times a day  sotalol: 40 milligram(s) orally 2 times a day  Xarelto 15 mg oral tablet: 1 tab(s) orally once a day (in the evening)  Zetia 10 mg oral tablet: 1 tab(s) orally once a day

## 2022-07-21 NOTE — PROGRESS NOTE ADULT - PROBLEM/PLAN-2
Patient voiced understanding to see her PCP or pain management for narcotics. She voiced understanding. DISPLAY PLAN FREE TEXT

## 2022-07-21 NOTE — PROGRESS NOTE ADULT - NSPROGADDITIONALINFOA_GEN_ALL_CORE
DW Pt and ACP
discussed with patient in detail, expresses understanding of treatment plans.  discussed with daughter  discussed with covering ACP  encounter 40 min

## 2022-07-21 NOTE — DISCHARGE NOTE PROVIDER - NSDCCPCAREPLAN_GEN_ALL_CORE_FT
PRINCIPAL DISCHARGE DIAGNOSIS  Diagnosis: Seizure  Assessment and Plan of Treatment: You were admitted for seizures. YOu were started on Keppra which you will continue taking. Stop taking Lamictal. Continue Dexamethasone as a taper as prescribed. Follow up with Neurology. You were noted to have a brain mass on CT scan however you declined further evaluation including MRI of the brain. Seek immediate medical attention for headaches, nausea/vomiting, dizziness, visual changes, numbness or weakness in your extremities or changes in mental status.      SECONDARY DISCHARGE DIAGNOSES  Diagnosis: Fall  Assessment and Plan of Treatment: YOur xrays were normal without fractures.    Diagnosis: History of depression  Assessment and Plan of Treatment: Continue lexapro.    Diagnosis: Chronic atrial fibrillation  Assessment and Plan of Treatment: Continue Xarelto and Diltiazem    Diagnosis: HLD (hyperlipidemia)  Assessment and Plan of Treatment: Continue lipid agents.    Diagnosis: Lung nodule  Assessment and Plan of Treatment: You were found to have a lung nodule on a neck CT scan. We recommended a CT scan of the chest but you have declined. Please follow up with your primary doctor if you do decide to work this up further.    Diagnosis: HTN (hypertension)  Assessment and Plan of Treatment: COntinue Dilitiazem

## 2023-06-12 NOTE — H&P ADULT - PROBLEM SELECTOR PLAN 2
Left message to call back.    Pt c/o left hip/knee pain s/p syncopal episode. Ecchymosis noted on left hip   Left Knee/Hip/Femur Xrays ordered   PT/OT eval

## 2023-07-31 NOTE — PHYSICAL THERAPY INITIAL EVALUATION ADULT - ORIENTATION, REHAB EVAL
In response to your comment on her urine:  Hi Ms. Marlene Allen,  Your urine test showed some blood. If I remember correct you had mentioned you were having some menstrual period bleeding when we had met, is that correct?     -Chon Zamora Jr., MD, AAHIVS  
oriented to person, place, time and situation

## 2023-09-26 PROBLEM — G93.89 OTHER SPECIFIED DISORDERS OF BRAIN: Chronic | Status: ACTIVE | Noted: 2022-07-20

## 2023-09-26 PROBLEM — I10 ESSENTIAL (PRIMARY) HYPERTENSION: Chronic | Status: ACTIVE | Noted: 2022-07-20

## 2023-09-26 PROBLEM — I25.10 ATHEROSCLEROTIC HEART DISEASE OF NATIVE CORONARY ARTERY WITHOUT ANGINA PECTORIS: Chronic | Status: ACTIVE | Noted: 2022-07-20

## 2023-09-26 PROBLEM — E78.5 HYPERLIPIDEMIA, UNSPECIFIED: Chronic | Status: ACTIVE | Noted: 2022-07-20

## 2023-09-26 PROBLEM — Z86.59 PERSONAL HISTORY OF OTHER MENTAL AND BEHAVIORAL DISORDERS: Chronic | Status: ACTIVE | Noted: 2022-07-20

## 2024-12-21 ENCOUNTER — NON-APPOINTMENT (OUTPATIENT)
Age: 89
End: 2024-12-21

## 2025-01-04 ENCOUNTER — NON-APPOINTMENT (OUTPATIENT)
Age: 89
End: 2025-01-04

## 2025-06-02 ENCOUNTER — NON-APPOINTMENT (OUTPATIENT)
Age: 89
End: 2025-06-02

## 2025-06-13 ENCOUNTER — APPOINTMENT (OUTPATIENT)
Dept: GERIATRICS | Facility: ASSISTED LIVING FACILITY | Age: 89
End: 2025-06-13

## 2025-06-13 VITALS
SYSTOLIC BLOOD PRESSURE: 124 MMHG | OXYGEN SATURATION: 94 % | HEART RATE: 66 BPM | DIASTOLIC BLOOD PRESSURE: 76 MMHG | TEMPERATURE: 97.52 F | RESPIRATION RATE: 20 BRPM

## 2025-06-13 PROCEDURE — 99350 HOME/RES VST EST HIGH MDM 60: CPT

## 2025-06-17 ENCOUNTER — NON-APPOINTMENT (OUTPATIENT)
Age: 89
End: 2025-06-17

## 2025-06-20 PROBLEM — Z71.89 ACP (ADVANCE CARE PLANNING): Status: ACTIVE | Noted: 2025-06-20

## 2025-06-20 PROBLEM — G30.9 ALZHEIMER'S DEMENTIA WITH BEHAVIORAL DISTURBANCE: Status: ACTIVE | Noted: 2025-06-20

## 2025-06-20 PROBLEM — G40.909 SEIZURE DISORDER: Status: ACTIVE | Noted: 2025-06-20

## 2025-06-20 PROBLEM — I50.9 CHF (CONGESTIVE HEART FAILURE): Status: ACTIVE | Noted: 2025-06-20

## 2025-06-20 PROBLEM — R29.6 RECURRENT FALLS: Status: ACTIVE | Noted: 2025-06-20

## 2025-06-20 PROBLEM — Z63.6 CAREGIVER BURDEN: Status: ACTIVE | Noted: 2025-06-20

## 2025-06-20 PROBLEM — G25.0 ESSENTIAL TREMOR: Status: ACTIVE | Noted: 2025-06-20

## 2025-06-20 PROBLEM — D33.2 BENIGN BRAIN TUMOR: Status: ACTIVE | Noted: 2025-06-20

## 2025-08-08 ENCOUNTER — APPOINTMENT (OUTPATIENT)
Dept: GERIATRICS | Facility: ASSISTED LIVING FACILITY | Age: 89
End: 2025-08-08

## 2025-09-15 VITALS — WEIGHT: 165 LBS

## 2025-09-19 DIAGNOSIS — Z23 ENCOUNTER FOR IMMUNIZATION: ICD-10-CM
